# Patient Record
Sex: FEMALE | Race: BLACK OR AFRICAN AMERICAN | NOT HISPANIC OR LATINO | Employment: OTHER | URBAN - METROPOLITAN AREA
[De-identification: names, ages, dates, MRNs, and addresses within clinical notes are randomized per-mention and may not be internally consistent; named-entity substitution may affect disease eponyms.]

---

## 2023-02-16 PROBLEM — G56.02 CARPAL TUNNEL SYNDROME OF LEFT WRIST: Status: ACTIVE | Noted: 2023-02-16

## 2023-02-16 PROBLEM — R05.9 COUGH: Status: ACTIVE | Noted: 2023-02-16

## 2023-02-16 PROBLEM — E55.9 VITAMIN D DEFICIENCY: Status: ACTIVE | Noted: 2023-02-16

## 2023-02-16 PROBLEM — R07.9 CHEST PAIN: Status: ACTIVE | Noted: 2023-02-16

## 2023-02-16 PROBLEM — M25.529 JOINT PAIN, ELBOW: Status: ACTIVE | Noted: 2023-02-16

## 2023-02-16 PROBLEM — R15.9 INCONTINENCE OF FECES: Status: ACTIVE | Noted: 2023-02-16

## 2023-02-16 PROBLEM — N81.89 PELVIC FLOOR WEAKNESS: Status: ACTIVE | Noted: 2023-02-16

## 2023-02-16 PROBLEM — M43.02 SPONDYLOLYSIS, CERVICAL REGION: Status: ACTIVE | Noted: 2023-02-16

## 2023-02-16 PROBLEM — M54.2 NECK ARTHRALGIA: Status: ACTIVE | Noted: 2023-02-16

## 2023-02-16 PROBLEM — N39.3 FEMALE STRESS INCONTINENCE: Status: ACTIVE | Noted: 2023-02-16

## 2023-02-16 PROBLEM — K21.9 GASTROESOPHAGEAL REFLUX DISEASE: Status: ACTIVE | Noted: 2023-02-16

## 2023-02-16 PROBLEM — R10.9 ABDOMINAL PAIN: Status: ACTIVE | Noted: 2023-02-16

## 2023-02-16 PROBLEM — F41.9 ANXIETY DISORDER: Status: ACTIVE | Noted: 2023-02-16

## 2023-02-16 PROBLEM — N39.41 URGE INCONTINENCE: Status: ACTIVE | Noted: 2023-02-16

## 2023-02-16 PROBLEM — R73.9 HYPERGLYCEMIA: Status: ACTIVE | Noted: 2023-02-16

## 2023-02-16 PROBLEM — I10 HYPERTENSION: Status: ACTIVE | Noted: 2023-02-16

## 2023-02-16 PROBLEM — N95.2 ATROPHIC VAGINITIS: Status: ACTIVE | Noted: 2023-02-16

## 2023-02-16 PROBLEM — D50.8 ACHLORHYDRIC ANEMIA: Status: ACTIVE | Noted: 2023-02-16

## 2023-02-16 PROBLEM — K46.9 ENTEROCELE: Status: ACTIVE | Noted: 2023-02-16

## 2023-02-16 PROBLEM — J02.9 PHARYNGITIS: Status: ACTIVE | Noted: 2023-02-16

## 2023-02-16 PROBLEM — M79.603 ARM PAIN: Status: ACTIVE | Noted: 2023-02-16

## 2023-02-16 PROBLEM — D72.829 LEUKOCYTOSIS: Status: ACTIVE | Noted: 2023-02-16

## 2023-02-16 PROBLEM — K76.0 STEATOSIS, LIVER: Status: ACTIVE | Noted: 2023-02-16

## 2023-02-16 PROBLEM — N81.6 RECTOCELE: Status: ACTIVE | Noted: 2023-02-16

## 2023-02-16 PROBLEM — K62.3 RECTAL PROLAPSE: Status: ACTIVE | Noted: 2023-02-16

## 2023-02-16 PROBLEM — K80.20 CHOLELITHIASIS: Status: ACTIVE | Noted: 2023-02-16

## 2023-02-16 PROBLEM — M76.62 ACHILLES TENDINITIS OF LEFT LOWER EXTREMITY: Status: ACTIVE | Noted: 2023-02-16

## 2023-02-16 PROBLEM — M25.572 LEFT ANKLE PAIN: Status: ACTIVE | Noted: 2023-02-16

## 2023-02-16 PROBLEM — R74.8 ELEVATED ALKALINE PHOSPHATASE LEVEL: Status: ACTIVE | Noted: 2023-02-16

## 2023-02-16 PROBLEM — R53.83 FATIGUE: Status: ACTIVE | Noted: 2023-02-16

## 2023-02-16 PROBLEM — R19.5 LOOSE STOOLS: Status: ACTIVE | Noted: 2023-02-16

## 2023-02-16 PROBLEM — J20.9 ACUTE BRONCHITIS: Status: ACTIVE | Noted: 2023-02-16

## 2023-02-16 PROBLEM — M54.9 BACK PAIN, ACUTE: Status: ACTIVE | Noted: 2023-02-16

## 2023-02-16 PROBLEM — R35.0 URINARY FREQUENCY: Status: ACTIVE | Noted: 2023-02-16

## 2023-02-16 PROBLEM — R32 URINE INCONTINENCE: Status: ACTIVE | Noted: 2023-02-16

## 2023-02-16 PROBLEM — E78.5 HYPERLIPIDEMIA: Status: ACTIVE | Noted: 2023-02-16

## 2023-02-16 PROBLEM — R93.2 ABNORMAL ULTRASOUND OF LIVER: Status: ACTIVE | Noted: 2023-02-16

## 2023-02-16 PROBLEM — M25.539 PAIN, WRIST JOINT: Status: ACTIVE | Noted: 2023-02-16

## 2023-02-16 RX ORDER — CLONIDINE HYDROCHLORIDE 0.1 MG/1
1 TABLET ORAL 3 TIMES DAILY
COMMUNITY
Start: 2016-06-04 | End: 2023-04-24 | Stop reason: SDUPTHER

## 2023-02-16 RX ORDER — OMEPRAZOLE 20 MG/1
1 CAPSULE, DELAYED RELEASE ORAL DAILY
COMMUNITY
Start: 2014-10-21 | End: 2023-07-03

## 2023-02-16 RX ORDER — TRIAMTERENE/HYDROCHLOROTHIAZID 37.5-25 MG
1 TABLET ORAL DAILY
COMMUNITY
Start: 2018-02-26 | End: 2023-04-03 | Stop reason: SDUPTHER

## 2023-02-16 RX ORDER — SOLIFENACIN SUCCINATE 5 MG/1
1 TABLET, FILM COATED ORAL DAILY
COMMUNITY
Start: 2022-07-01 | End: 2023-06-23

## 2023-02-16 RX ORDER — VERAPAMIL HYDROCHLORIDE 240 MG/1
1 TABLET, FILM COATED, EXTENDED RELEASE ORAL DAILY
COMMUNITY
Start: 2015-04-13 | End: 2024-02-13 | Stop reason: SDUPTHER

## 2023-02-16 RX ORDER — OXYBUTYNIN CHLORIDE 5 MG/1
1 TABLET ORAL DAILY
COMMUNITY
End: 2023-03-30 | Stop reason: SDUPTHER

## 2023-02-16 RX ORDER — ATORVASTATIN CALCIUM 20 MG/1
1 TABLET, FILM COATED ORAL DAILY
COMMUNITY
Start: 2017-08-10 | End: 2023-06-23

## 2023-03-25 ASSESSMENT — ENCOUNTER SYMPTOMS: HYPERTENSION: 1

## 2023-03-30 ENCOUNTER — OFFICE VISIT (OUTPATIENT)
Dept: PRIMARY CARE | Facility: CLINIC | Age: 63
End: 2023-03-30
Payer: MEDICARE

## 2023-03-30 VITALS — BODY MASS INDEX: 32.59 KG/M2 | SYSTOLIC BLOOD PRESSURE: 152 MMHG | DIASTOLIC BLOOD PRESSURE: 86 MMHG | WEIGHT: 184 LBS

## 2023-03-30 DIAGNOSIS — E78.5 HYPERLIPIDEMIA, UNSPECIFIED HYPERLIPIDEMIA TYPE: ICD-10-CM

## 2023-03-30 DIAGNOSIS — I10 HYPERTENSION, UNSPECIFIED TYPE: Primary | ICD-10-CM

## 2023-03-30 DIAGNOSIS — N39.41 URGE INCONTINENCE: ICD-10-CM

## 2023-03-30 DIAGNOSIS — D64.9 ANEMIA, UNSPECIFIED TYPE: ICD-10-CM

## 2023-03-30 DIAGNOSIS — N39.46 MIXED STRESS AND URGE URINARY INCONTINENCE: ICD-10-CM

## 2023-03-30 PROCEDURE — 3077F SYST BP >= 140 MM HG: CPT | Performed by: INTERNAL MEDICINE

## 2023-03-30 PROCEDURE — 85025 COMPLETE CBC W/AUTO DIFF WBC: CPT | Performed by: INTERNAL MEDICINE

## 2023-03-30 PROCEDURE — 36415 COLL VENOUS BLD VENIPUNCTURE: CPT | Performed by: INTERNAL MEDICINE

## 2023-03-30 PROCEDURE — 1036F TOBACCO NON-USER: CPT | Performed by: INTERNAL MEDICINE

## 2023-03-30 PROCEDURE — 80053 COMPREHEN METABOLIC PANEL: CPT | Performed by: INTERNAL MEDICINE

## 2023-03-30 PROCEDURE — 3079F DIAST BP 80-89 MM HG: CPT | Performed by: INTERNAL MEDICINE

## 2023-03-30 PROCEDURE — 99214 OFFICE O/P EST MOD 30 MIN: CPT | Performed by: INTERNAL MEDICINE

## 2023-03-30 RX ORDER — OXYBUTYNIN CHLORIDE 5 MG/1
5 TABLET ORAL 2 TIMES DAILY
Qty: 180 TABLET | Refills: 3 | Status: SHIPPED | OUTPATIENT
Start: 2023-03-30 | End: 2023-10-24 | Stop reason: SDUPTHER

## 2023-03-30 NOTE — PROGRESS NOTES
Subjective   Patient ID: Freda Douglas is a 62 y.o. female who presents for Follow-up (BP follow up).    HPI   62 years old female comes in to see me as a follow-up on her hypertension and hyperlipidemia, she does have incontinence of urine and she is taking oxybutynin 5 mg up to now twice a day.  She feels well and has no specific symptoms.  Review of Systems  12 system reviewed and  Objective   /86 (BP Location: Left arm, Patient Position: Sitting, BP Cuff Size: Adult)   Wt 83.5 kg (184 lb)   BMI 32.59 kg/m²     She lives in [place] by herself.  Allergic to sulfa but she took it after that and she did fine.  I advised her not to take self since we do not know for sure what is going on.  Non-smoker social alcohol intake.  Fully retired  And history of sacral tumor giant cell tumor removed in 1990.  Feels dizzy at times but does not check her blood pressure when she is dizzy so advised her to do so specifically when she is dizzy otherwise at least twice a day.  Physical Exam  He is alert and oriented very pleasant in no distress.  Nonicteric sclera or jaundice.  Face symmetrical cranial nerves intact.  Neck supple no masses no lymph node no thyromegaly or jugular venous distention.  Lungs clear no rales or wheezing.  Heart normal S1 and S2 regular rhythm.  Abdomen benign neurologically intact.  Advised the patient to check her blood pressure in different positions at home and to keep a log and come back at the next visit with her log and her blood pressure machine.  Advised her about dizzy spells could be hypertension or hypotension and may need to adjust her medication.  Assessment/Plan   Diagnoses and all orders for this visit:  Hypertension, unspecified type  -     Comprehensive Metabolic Panel  -     Basic Metabolic Panel  Anemia, unspecified type  -     CBC  Mixed stress and urge urinary incontinence  -     oxybutynin (Ditropan) 5 mg tablet; Take 1 tablet (5 mg) by mouth in the morning and  1 tablet (5 mg) before bedtime.  Urge incontinence  Hyperlipidemia, unspecified hyperlipidemia type

## 2023-04-03 DIAGNOSIS — I10 HYPERTENSION, UNSPECIFIED TYPE: Primary | ICD-10-CM

## 2023-04-03 RX ORDER — TRIAMTERENE/HYDROCHLOROTHIAZID 37.5-25 MG
1 TABLET ORAL DAILY
Qty: 90 TABLET | Refills: 3 | Status: SHIPPED | OUTPATIENT
Start: 2023-04-03 | End: 2023-11-28 | Stop reason: SDUPTHER

## 2023-04-04 ENCOUNTER — TELEPHONE (OUTPATIENT)
Dept: PRIMARY CARE | Facility: CLINIC | Age: 63
End: 2023-04-04
Payer: MEDICARE

## 2023-04-21 ENCOUNTER — TELEPHONE (OUTPATIENT)
Dept: PRIMARY CARE | Facility: CLINIC | Age: 63
End: 2023-04-21
Payer: MEDICARE

## 2023-04-21 DIAGNOSIS — N32.81 OVERACTIVE BLADDER: ICD-10-CM

## 2023-04-21 RX ORDER — OXYBUTYNIN CHLORIDE 5 MG/1
5 TABLET ORAL 2 TIMES DAILY
Qty: 180 TABLET | Refills: 0 | Status: SHIPPED | OUTPATIENT
Start: 2023-04-21 | End: 2023-06-23

## 2023-04-21 NOTE — TELEPHONE ENCOUNTER
patient did not receive medicasystem did not work yesterday and Rx has to be resend tion she requested

## 2023-04-24 DIAGNOSIS — I10 HYPERTENSION, UNSPECIFIED TYPE: Primary | ICD-10-CM

## 2023-04-24 RX ORDER — CLONIDINE HYDROCHLORIDE 0.1 MG/1
0.1 TABLET ORAL 3 TIMES DAILY
Qty: 180 TABLET | Refills: 3 | Status: SHIPPED | OUTPATIENT
Start: 2023-04-24

## 2023-04-24 RX ORDER — CLONIDINE HYDROCHLORIDE 0.1 MG/1
0.1 TABLET ORAL 2 TIMES DAILY
COMMUNITY
End: 2023-06-23

## 2023-06-21 ASSESSMENT — ENCOUNTER SYMPTOMS: HYPERTENSION: 1

## 2023-06-23 ENCOUNTER — OFFICE VISIT (OUTPATIENT)
Dept: PRIMARY CARE | Facility: CLINIC | Age: 63
End: 2023-06-23
Payer: MEDICARE

## 2023-06-23 VITALS — SYSTOLIC BLOOD PRESSURE: 130 MMHG | DIASTOLIC BLOOD PRESSURE: 70 MMHG | WEIGHT: 176 LBS | BODY MASS INDEX: 31.18 KG/M2

## 2023-06-23 DIAGNOSIS — E78.5 HYPERLIPIDEMIA, UNSPECIFIED HYPERLIPIDEMIA TYPE: ICD-10-CM

## 2023-06-23 DIAGNOSIS — I10 HYPERTENSION, UNSPECIFIED TYPE: Primary | ICD-10-CM

## 2023-06-23 DIAGNOSIS — R53.83 FATIGUE, UNSPECIFIED TYPE: ICD-10-CM

## 2023-06-23 DIAGNOSIS — K21.9 GASTROESOPHAGEAL REFLUX DISEASE WITHOUT ESOPHAGITIS: ICD-10-CM

## 2023-06-23 DIAGNOSIS — N31.9 NEUROGENIC BLADDER: ICD-10-CM

## 2023-06-23 PROCEDURE — 80061 LIPID PANEL: CPT | Performed by: INTERNAL MEDICINE

## 2023-06-23 PROCEDURE — 1036F TOBACCO NON-USER: CPT | Performed by: INTERNAL MEDICINE

## 2023-06-23 PROCEDURE — 3075F SYST BP GE 130 - 139MM HG: CPT | Performed by: INTERNAL MEDICINE

## 2023-06-23 PROCEDURE — 80048 BASIC METABOLIC PNL TOTAL CA: CPT | Performed by: INTERNAL MEDICINE

## 2023-06-23 PROCEDURE — 84443 ASSAY THYROID STIM HORMONE: CPT | Performed by: INTERNAL MEDICINE

## 2023-06-23 PROCEDURE — 99213 OFFICE O/P EST LOW 20 MIN: CPT | Performed by: INTERNAL MEDICINE

## 2023-06-23 PROCEDURE — 85025 COMPLETE CBC W/AUTO DIFF WBC: CPT | Performed by: INTERNAL MEDICINE

## 2023-06-23 PROCEDURE — 3078F DIAST BP <80 MM HG: CPT | Performed by: INTERNAL MEDICINE

## 2023-06-23 RX ORDER — OMEPRAZOLE 20 MG/1
1 TABLET, DELAYED RELEASE ORAL DAILY
COMMUNITY
End: 2023-06-23 | Stop reason: SDUPTHER

## 2023-06-23 RX ORDER — CLONIDINE HYDROCHLORIDE 0.1 MG/1
0.1 TABLET ORAL 2 TIMES DAILY
COMMUNITY
Start: 2016-06-04 | End: 2024-03-12 | Stop reason: SDUPTHER

## 2023-06-23 RX ORDER — DULOXETIN HYDROCHLORIDE 30 MG/1
30 CAPSULE, DELAYED RELEASE ORAL DAILY
COMMUNITY
Start: 2019-08-06

## 2023-06-23 RX ORDER — VALACYCLOVIR HYDROCHLORIDE 1 G/1
1000 TABLET, FILM COATED ORAL 3 TIMES DAILY
COMMUNITY
Start: 2021-09-01

## 2023-06-23 RX ORDER — ESCITALOPRAM OXALATE 20 MG/1
20 TABLET ORAL DAILY
COMMUNITY
Start: 2006-12-13

## 2023-06-23 RX ORDER — OMEPRAZOLE 20 MG/1
20 TABLET, DELAYED RELEASE ORAL DAILY
Qty: 90 TABLET | Refills: 3 | Status: SHIPPED | OUTPATIENT
Start: 2023-06-23 | End: 2023-11-28 | Stop reason: SDUPTHER

## 2023-06-23 RX ORDER — TRIAMTERENE AND HYDROCHLOROTHIAZIDE 37.5; 25 MG/1; MG/1
1 CAPSULE ORAL EVERY MORNING
COMMUNITY
End: 2024-01-09 | Stop reason: SDUPTHER

## 2023-06-23 RX ORDER — ATORVASTATIN CALCIUM 20 MG/1
1 TABLET, FILM COATED ORAL DAILY
COMMUNITY
End: 2024-02-13 | Stop reason: SDUPTHER

## 2023-06-23 NOTE — PROGRESS NOTES
Answers submitted by the patient for this visit:  High Blood Pressure Questionnaire (Submitted on 6/21/2023)  Chief Complaint: Hypertension  Chronicity: chronic  Onset: more than 1 year ago  Progression since onset: waxing and waning  Condition status: resistant  anxiety: Yes  malaise/fatigue: Yes  CAD risks: dyslipidemia, obesity, post-menopausal state  Compliance problems: no compliance problems

## 2023-06-23 NOTE — PROGRESS NOTES
Subjective   Patient ID: Freda Douglas is a 62 y.o. female who presents for Follow-up and Hypertension.    HPI   62 years old female comes in to see me complaining of urgent urination day and night.  She is unable to hold it and she will need to have a bathroom close by all the time.  She has seen a urologist before who offered her some procedure she did not like and she declined by the name of Dr. Calloway.  Now she has the same symptoms and she is taking oxybutynin 5 mg twice a day but she did not think it is helping her and she still does not think it is helping her.  I offered her different urology consultation she would like to consider that not right away.  She also takes omeprazole for heartburn Catapres or clonidine 0.1 mg in the morning and in the evening triamterene HCTZ or Maxide 25 1 tablet/day with verapamil to 40 mg/day.  Review of Systems  12 system reviewed and reconciled.  Objective   /70   Wt 79.8 kg (176 lb)   BMI 31.18 kg/m²     Physical Exam  Alert oriented in no distress nonicteric sclera or jaundice pleasant and cooperative.  Neck supple no masses no lymph no thyromegaly or jugular venous distention.  Lungs clear no rales no wheezing or crackles.  Heart normal S1 and S2 regular rhythm.  Abdomen benign neurologically intact.  Assessment/Plan   Diagnoses and all orders for this visit:  Hypertension, unspecified type  -     CBC  -     Basic Metabolic Panel  Hyperlipidemia, unspecified hyperlipidemia type  -     Lipid Panel  Fatigue, unspecified type  -     Thyroid Stimulating Hormone  Neurogenic bladder

## 2023-06-26 DIAGNOSIS — K21.9 GASTROESOPHAGEAL REFLUX DISEASE WITHOUT ESOPHAGITIS: Primary | ICD-10-CM

## 2023-07-03 RX ORDER — OMEPRAZOLE 20 MG/1
CAPSULE, DELAYED RELEASE ORAL
Qty: 90 CAPSULE | Refills: 1 | Status: SHIPPED | OUTPATIENT
Start: 2023-07-03 | End: 2023-07-10 | Stop reason: SDUPTHER

## 2023-07-09 ENCOUNTER — TELEPHONE (OUTPATIENT)
Dept: PRIMARY CARE | Facility: CLINIC | Age: 63
End: 2023-07-09
Payer: MEDICARE

## 2023-07-10 DIAGNOSIS — K21.9 GASTROESOPHAGEAL REFLUX DISEASE WITHOUT ESOPHAGITIS: ICD-10-CM

## 2023-07-10 RX ORDER — OMEPRAZOLE 20 MG/1
20 CAPSULE, DELAYED RELEASE ORAL DAILY
Qty: 90 CAPSULE | Refills: 3 | Status: SHIPPED | OUTPATIENT
Start: 2023-07-10

## 2023-10-24 ENCOUNTER — TELEPHONE (OUTPATIENT)
Dept: PRIMARY CARE | Facility: CLINIC | Age: 63
End: 2023-10-24
Payer: MEDICARE

## 2023-10-24 DIAGNOSIS — N39.46 MIXED STRESS AND URGE URINARY INCONTINENCE: ICD-10-CM

## 2023-10-25 RX ORDER — OXYBUTYNIN CHLORIDE 5 MG/1
5 TABLET ORAL 2 TIMES DAILY
Qty: 180 TABLET | Refills: 3 | Status: SHIPPED | OUTPATIENT
Start: 2023-10-25 | End: 2023-12-19

## 2023-11-28 ENCOUNTER — TELEMEDICINE (OUTPATIENT)
Dept: PRIMARY CARE | Facility: CLINIC | Age: 63
End: 2023-11-28
Payer: MEDICARE

## 2023-11-28 DIAGNOSIS — J01.90 ACUTE NON-RECURRENT SINUSITIS, UNSPECIFIED LOCATION: ICD-10-CM

## 2023-11-28 DIAGNOSIS — J20.9 ACUTE BRONCHITIS, UNSPECIFIED ORGANISM: Primary | ICD-10-CM

## 2023-11-28 PROCEDURE — 99441 PR PHYS/QHP TELEPHONE EVALUATION 5-10 MIN: CPT | Performed by: INTERNAL MEDICINE

## 2023-11-28 RX ORDER — AZITHROMYCIN 250 MG/1
TABLET, FILM COATED ORAL
Qty: 6 TABLET | Refills: 0 | Status: SHIPPED | OUTPATIENT
Start: 2023-11-28 | End: 2023-12-03

## 2023-11-28 RX ORDER — BENZONATATE 200 MG/1
200 CAPSULE ORAL 3 TIMES DAILY PRN
Qty: 30 CAPSULE | Refills: 1 | Status: SHIPPED | OUTPATIENT
Start: 2023-11-28 | End: 2023-12-28

## 2023-11-28 NOTE — PROGRESS NOTES
Subjective   Patient ID: Freda Escamilla is a 62 y.o. female this is a virtual visit.    HPI  I had telephone visit with Ms. escamilla today on November 28, 2023 at around 4:15 PM.  She is complaining of congested had for 2 weeks coughing sometimes clear sometimes yellow phlegm, wheezing at times and feeling tired, cannot get warm during the day and she feels exhausted sometimes.  She thinks maybe her sinuses are infected.  It has been 2 weeks and now she needs some help on top of that she had severe headaches.  After discussing her symptoms I agreed that she could have a sinus infection or upper bronchitis.  We also agreed to give her an antibiotic and a cough Perles.  Advised her to increase vitamin C vitamin D and zinc oxide.  Her pharmacy is [facility] [id number].  Review of Systems  Congested head with severe headache, sinus infection, coughing phlegm wheezing tired fatigued and feeling cold  Objective   There were no vitals taken for this visit.  This visit was completed via telephone with the possibility of COVID-19 infection and due to the restrictions.  All issues discussed and addressed but no physical examination was performed.  If it was felt that the patient should be evaluated in clinic then they were directed there.  The patient verbally consented to this visit.  I spent 5 minutes and more than on the phone with this patient discussing health concerns  Physical Exam  No physical exam was done.  Assessment/Plan   Diagnoses and all orders for this visit:  Acute bronchitis, unspecified organism  Acute non-recurrent sinusitis, unspecified location

## 2023-12-19 DIAGNOSIS — N39.46 MIXED STRESS AND URGE URINARY INCONTINENCE: ICD-10-CM

## 2023-12-19 RX ORDER — OXYBUTYNIN CHLORIDE 5 MG/1
5 TABLET ORAL DAILY
Qty: 90 TABLET | Refills: 3 | Status: SHIPPED | OUTPATIENT
Start: 2023-12-19

## 2024-01-09 ENCOUNTER — TELEPHONE (OUTPATIENT)
Dept: PRIMARY CARE | Facility: CLINIC | Age: 64
End: 2024-01-09
Payer: MEDICARE

## 2024-01-09 DIAGNOSIS — I10 HYPERTENSION, UNSPECIFIED TYPE: ICD-10-CM

## 2024-01-09 RX ORDER — TRIAMTERENE AND HYDROCHLOROTHIAZIDE 37.5; 25 MG/1; MG/1
1 CAPSULE ORAL EVERY MORNING
Qty: 90 CAPSULE | Refills: 3 | Status: SHIPPED | OUTPATIENT
Start: 2024-01-09 | End: 2025-01-08

## 2024-02-04 ASSESSMENT — ENCOUNTER SYMPTOMS
SWEATS: 0
HEADACHES: 0
BLURRED VISION: 1
HYPERTENSION: 1
PND: 0
ORTHOPNEA: 0
PALPITATIONS: 0
NECK PAIN: 0
SHORTNESS OF BREATH: 0

## 2024-02-06 ENCOUNTER — OFFICE VISIT (OUTPATIENT)
Dept: PRIMARY CARE | Facility: CLINIC | Age: 64
End: 2024-02-06
Payer: MEDICARE

## 2024-02-06 VITALS
DIASTOLIC BLOOD PRESSURE: 81 MMHG | OXYGEN SATURATION: 93 % | BODY MASS INDEX: 31.35 KG/M2 | SYSTOLIC BLOOD PRESSURE: 167 MMHG | WEIGHT: 177 LBS | HEART RATE: 70 BPM | TEMPERATURE: 97.3 F

## 2024-02-06 DIAGNOSIS — E78.2 MIXED HYPERLIPIDEMIA: ICD-10-CM

## 2024-02-06 DIAGNOSIS — I10 HYPERTENSION, UNSPECIFIED TYPE: Primary | ICD-10-CM

## 2024-02-06 DIAGNOSIS — K21.9 GASTROESOPHAGEAL REFLUX DISEASE WITHOUT ESOPHAGITIS: ICD-10-CM

## 2024-02-06 DIAGNOSIS — E78.5 HYPERLIPIDEMIA, UNSPECIFIED HYPERLIPIDEMIA TYPE: ICD-10-CM

## 2024-02-06 PROCEDURE — 99214 OFFICE O/P EST MOD 30 MIN: CPT | Performed by: INTERNAL MEDICINE

## 2024-02-06 PROCEDURE — 1036F TOBACCO NON-USER: CPT | Performed by: INTERNAL MEDICINE

## 2024-02-06 PROCEDURE — 80061 LIPID PANEL: CPT | Performed by: INTERNAL MEDICINE

## 2024-02-06 PROCEDURE — 3079F DIAST BP 80-89 MM HG: CPT | Performed by: INTERNAL MEDICINE

## 2024-02-06 PROCEDURE — 3077F SYST BP >= 140 MM HG: CPT | Performed by: INTERNAL MEDICINE

## 2024-02-06 PROCEDURE — 80053 COMPREHEN METABOLIC PANEL: CPT | Performed by: INTERNAL MEDICINE

## 2024-02-06 ASSESSMENT — PATIENT HEALTH QUESTIONNAIRE - PHQ9
SUM OF ALL RESPONSES TO PHQ9 QUESTIONS 1 AND 2: 0
1. LITTLE INTEREST OR PLEASURE IN DOING THINGS: NOT AT ALL
2. FEELING DOWN, DEPRESSED OR HOPELESS: NOT AT ALL

## 2024-02-06 ASSESSMENT — ENCOUNTER SYMPTOMS
LOSS OF SENSATION IN FEET: 0
OCCASIONAL FEELINGS OF UNSTEADINESS: 0
DEPRESSION: 0

## 2024-02-06 NOTE — PROGRESS NOTES
Subjective   Patient ID: Freda Douglas is a 63 y.o. female who presents for Follow-up and Med Refill.    HPI   63 years old female comes in to see me today to check on her medication and her medical condition.  She needs refill on atorvastatin and on verapamil.  She is with [facility] pharmacy.  She has no specific symptoms or complaint.  She gained 1 pounds on.  Her blood pressure is slightly elevated 167/81.  We discussed diet and weight loss.  Review of Systems  12 system reviewed 12 systems are negative.  Objective   /81 (BP Location: Right arm, Patient Position: Sitting, BP Cuff Size: Large adult)   Pulse 70   Temp 36.3 °C (97.3 °F) (Temporal)   Wt 80.3 kg (177 lb)   SpO2 93%   BMI 31.35 kg/m²     Physical Exam  Alert oriented in no distress very pleasant and cooperative.  Nonicteric sclera or jaundice.  Face symmetrical cranial nerves intact.  Neck supple no masses no lymph node thyromegaly or jugular venous distention or carotid bruits.  Lungs clear no rales wheezing or crackles.  Heart normal S1 and S2 regular rhythm.  Abdomen benign nontender no masses no organomegaly.  Neurological exam intact.  Since the blood pressure is slightly elevated we agreed to increase clonidine point 1 mg 2 in the morning and then to stay on 1 at lunch and 1 at dinner time.  So an increase of 1 extra tablet of 0.1 mg a day.  Also advised the patient to watch her blood pressure and to keep a log daily.  Also invited her to bring her blood pressure pill and her log at the next visit.  To call me as soon as she knows that her blood pressure is staying up or going down to keep me informed.  Next visit bring all your medication and your blood pressure with you with your log so we can work on your blood pressure and controlled better.  Assessment/Plan     Diagnoses and all orders for this visit:  Hypertension, unspecified type  -     Comprehensive Metabolic Panel  Mixed hyperlipidemia  -     Lipid  Panel  Gastroesophageal reflux disease without esophagitis  Hyperlipidemia, unspecified hyperlipidemia type

## 2024-02-09 ENCOUNTER — TELEPHONE (OUTPATIENT)
Dept: PRIMARY CARE | Facility: CLINIC | Age: 64
End: 2024-02-09
Payer: MEDICARE

## 2024-02-09 NOTE — TELEPHONE ENCOUNTER
----- Message from Ishan Chandra MD sent at 2/7/2024  2:11 PM EST -----  Regarding: r  Lab results are within normal limit.  Excellent liver function test and excellent renal test.  Normal lipid panel.  Stay on the same diet and activity with exercising and watching your weight.  Doing well.

## 2024-02-13 ENCOUNTER — TELEPHONE (OUTPATIENT)
Dept: PRIMARY CARE | Facility: CLINIC | Age: 64
End: 2024-02-13
Payer: MEDICARE

## 2024-02-13 DIAGNOSIS — I10 HYPERTENSION, UNSPECIFIED TYPE: ICD-10-CM

## 2024-02-13 RX ORDER — VERAPAMIL HYDROCHLORIDE 240 MG/1
240 TABLET, FILM COATED, EXTENDED RELEASE ORAL DAILY
Qty: 90 TABLET | Refills: 3 | Status: SHIPPED | OUTPATIENT
Start: 2024-02-13 | End: 2024-02-14 | Stop reason: SDUPTHER

## 2024-02-13 RX ORDER — ATORVASTATIN CALCIUM 20 MG/1
20 TABLET, FILM COATED ORAL DAILY
Qty: 90 TABLET | Refills: 3 | Status: SHIPPED | OUTPATIENT
Start: 2024-02-13 | End: 2024-02-14 | Stop reason: SDUPTHER

## 2024-02-14 DIAGNOSIS — I10 HYPERTENSION, UNSPECIFIED TYPE: ICD-10-CM

## 2024-02-14 RX ORDER — ATORVASTATIN CALCIUM 20 MG/1
20 TABLET, FILM COATED ORAL DAILY
Qty: 90 TABLET | Refills: 3 | Status: SHIPPED | OUTPATIENT
Start: 2024-02-14 | End: 2025-02-13

## 2024-02-14 RX ORDER — VERAPAMIL HYDROCHLORIDE 240 MG/1
240 TABLET, FILM COATED, EXTENDED RELEASE ORAL DAILY
Qty: 90 TABLET | Refills: 3 | Status: SHIPPED | OUTPATIENT
Start: 2024-02-14 | End: 2025-02-13

## 2024-02-19 ENCOUNTER — TELEPHONE (OUTPATIENT)
Dept: PRIMARY CARE | Facility: CLINIC | Age: 64
End: 2024-02-19
Payer: MEDICARE

## 2024-02-19 NOTE — TELEPHONE ENCOUNTER
----- Message from Ishan Chandra MD sent at 2/19/2024  8:43 AM EST -----  Regarding: r  Normal results

## 2024-03-12 ENCOUNTER — TELEPHONE (OUTPATIENT)
Dept: PRIMARY CARE | Facility: CLINIC | Age: 64
End: 2024-03-12
Payer: MEDICARE

## 2024-03-12 DIAGNOSIS — I10 HYPERTENSION, UNSPECIFIED TYPE: ICD-10-CM

## 2024-03-12 RX ORDER — CLONIDINE HYDROCHLORIDE 0.1 MG/1
0.1 TABLET ORAL 4 TIMES DAILY
Qty: 360 TABLET | Refills: 3 | Status: SHIPPED | OUTPATIENT
Start: 2024-03-12 | End: 2025-03-12

## 2024-09-01 DIAGNOSIS — N39.46 MIXED STRESS AND URGE URINARY INCONTINENCE: ICD-10-CM

## 2024-09-04 RX ORDER — OXYBUTYNIN CHLORIDE 5 MG/1
5 TABLET ORAL 2 TIMES DAILY
Qty: 180 TABLET | Refills: 2 | Status: SHIPPED | OUTPATIENT
Start: 2024-09-04

## 2024-09-27 DIAGNOSIS — K21.9 GASTROESOPHAGEAL REFLUX DISEASE WITHOUT ESOPHAGITIS: ICD-10-CM

## 2024-09-30 RX ORDER — OMEPRAZOLE 20 MG/1
CAPSULE, DELAYED RELEASE ORAL
Qty: 90 CAPSULE | Refills: 1 | Status: SHIPPED | OUTPATIENT
Start: 2024-09-30

## 2024-10-10 ASSESSMENT — ENCOUNTER SYMPTOMS
SWEATS: 0
BLURRED VISION: 0
NECK PAIN: 1
SHORTNESS OF BREATH: 0
ORTHOPNEA: 0
HEADACHES: 0
PND: 0
HYPERTENSION: 1
PALPITATIONS: 0

## 2024-10-11 ENCOUNTER — APPOINTMENT (OUTPATIENT)
Dept: PRIMARY CARE | Facility: CLINIC | Age: 64
End: 2024-10-11
Payer: MEDICARE

## 2024-10-11 VITALS
HEIGHT: 62 IN | WEIGHT: 170 LBS | DIASTOLIC BLOOD PRESSURE: 52 MMHG | OXYGEN SATURATION: 96 % | SYSTOLIC BLOOD PRESSURE: 143 MMHG | TEMPERATURE: 97.5 F | BODY MASS INDEX: 31.28 KG/M2 | HEART RATE: 59 BPM

## 2024-10-11 DIAGNOSIS — D64.9 ANEMIA, UNSPECIFIED TYPE: Primary | ICD-10-CM

## 2024-10-11 DIAGNOSIS — R53.83 FATIGUE, UNSPECIFIED TYPE: ICD-10-CM

## 2024-10-11 DIAGNOSIS — Z12.31 SCREENING MAMMOGRAM FOR BREAST CANCER: ICD-10-CM

## 2024-10-11 DIAGNOSIS — Z00.00 ROUTINE GENERAL MEDICAL EXAMINATION AT HEALTH CARE FACILITY: ICD-10-CM

## 2024-10-11 DIAGNOSIS — E78.2 MIXED HYPERLIPIDEMIA: ICD-10-CM

## 2024-10-11 DIAGNOSIS — I10 HYPERTENSION, UNSPECIFIED TYPE: ICD-10-CM

## 2024-10-11 LAB
ALBUMIN SERPL BCP-MCNC: 3.9 G/DL (ref 3.4–5)
ALP SERPL-CCNC: 117 U/L (ref 45–117)
ALT SERPL W P-5'-P-CCNC: 31 U/L (ref 16–63)
ANION GAP SERPL CALC-SCNC: 10 MMOL/L (ref 10–20)
AST SERPL W P-5'-P-CCNC: 20 U/L (ref 15–37)
BASOPHILS # BLD AUTO: 0.01 X10*3/UL (ref 0.1–1.6)
BASOPHILS NFR BLD AUTO: 0.16 % (ref 0–0.3)
BILIRUB SERPL-MCNC: 0.5 MG/DL (ref 0.2–1)
BUN SERPL-MCNC: 11 MG/DL (ref 7–18)
CALCIUM SERPL-MCNC: 9.6 MG/DL (ref 8.5–10.1)
CHLORIDE SERPL-SCNC: 97 MMOL/L (ref 98–107)
CHOLEST SERPL-MCNC: 156 MG/DL (ref 0–199)
CHOLESTEROL/HDL RATIO: 2.8 (ref 4.2–7)
CO2 SERPL-SCNC: 31 MMOL/L (ref 21–32)
CREAT SERPL-MCNC: 0.83 MG/DL (ref 0.6–1.1)
EGFRCR SERPLBLD CKD-EPI 2021: 79 ML/MIN/1.73M*2
EOSINOPHIL # BLD AUTO: 0.13 X10*3/UL (ref 0.04–0.5)
EOSINOPHIL NFR BLD AUTO: 1.59 % (ref 0.7–7)
ERYTHROCYTE [DISTWIDTH] IN BLOOD BY AUTOMATED COUNT: 15.1 % (ref 11.5–14.5)
GLUCOSE SERPL-MCNC: 98 MG/DL (ref 74–100)
HCT VFR BLD AUTO: 40.9 % (ref 36.6–46.6)
HDLC SERPL-MCNC: 56 MG/DL (ref 40–59)
HGB BLD-MCNC: 13.77 G/DL (ref 12–15.4)
IS PATIENT FASTING: ABNORMAL
LDLC SERPL DIRECT ASSAY-MCNC: 85 MG/DL (ref 0–100)
LYMPHOCYTES # BLD AUTO: 1.83 X10*3/UL (ref 0–6)
LYMPHOCYTES NFR BLD AUTO: 23.14 % (ref 20.5–51.1)
MCH RBC QN AUTO: 29.1 PG (ref 26–32)
MCHC RBC AUTO-ENTMCNC: 33.7 G/DL (ref 31–38)
MCV RBC AUTO: 86.4 FL (ref 80–96)
MONOCYTES # BLD AUTO: 0.46 X10*3/UL (ref 1.6–24.9)
MONOCYTES NFR BLD AUTO: 5.79 % (ref 1.7–9.3)
NEUTROPHILS # BLD AUTO: 5.49 X10*3/UL (ref 1.4–6.5)
NEUTROPHILS NFR BLD AUTO: 69.32 % (ref 42.2–75.2)
PLATELET # BLD AUTO: 348.2 X10*3/UL (ref 150–450)
PMV BLD AUTO: 8.22 FL (ref 7.8–11)
POTASSIUM SERPL-SCNC: 3.3 MMOL/L (ref 3.5–5.1)
PROT SERPL-MCNC: 8.1 G/DL (ref 6.4–8.2)
RBC # BLD AUTO: 4.73 X10*6/UL (ref 3.9–5.3)
SODIUM SERPL-SCNC: 135 MMOL/L (ref 136–145)
TRIGL SERPL-MCNC: 131 MG/DL
TSH SERPL-ACNC: 1.61 MIU/L (ref 0.44–3.98)
WBC # BLD AUTO: 7.92 X10*3/UL (ref 4.5–10.5)

## 2024-10-11 PROCEDURE — 1036F TOBACCO NON-USER: CPT | Performed by: INTERNAL MEDICINE

## 2024-10-11 PROCEDURE — 80061 LIPID PANEL: CPT | Performed by: INTERNAL MEDICINE

## 2024-10-11 PROCEDURE — 85025 COMPLETE CBC W/AUTO DIFF WBC: CPT

## 2024-10-11 PROCEDURE — 3077F SYST BP >= 140 MM HG: CPT | Performed by: INTERNAL MEDICINE

## 2024-10-11 PROCEDURE — G0438 PPPS, INITIAL VISIT: HCPCS | Performed by: INTERNAL MEDICINE

## 2024-10-11 PROCEDURE — 99214 OFFICE O/P EST MOD 30 MIN: CPT | Performed by: INTERNAL MEDICINE

## 2024-10-11 PROCEDURE — 3008F BODY MASS INDEX DOCD: CPT | Performed by: INTERNAL MEDICINE

## 2024-10-11 PROCEDURE — 84443 ASSAY THYROID STIM HORMONE: CPT | Performed by: INTERNAL MEDICINE

## 2024-10-11 PROCEDURE — 80053 COMPREHEN METABOLIC PANEL: CPT | Performed by: INTERNAL MEDICINE

## 2024-10-11 PROCEDURE — 3078F DIAST BP <80 MM HG: CPT | Performed by: INTERNAL MEDICINE

## 2024-10-11 ASSESSMENT — PATIENT HEALTH QUESTIONNAIRE - PHQ9
SUM OF ALL RESPONSES TO PHQ9 QUESTIONS 1 AND 2: 0
2. FEELING DOWN, DEPRESSED OR HOPELESS: NOT AT ALL
1. LITTLE INTEREST OR PLEASURE IN DOING THINGS: NOT AT ALL

## 2024-10-11 ASSESSMENT — COLUMBIA-SUICIDE SEVERITY RATING SCALE - C-SSRS
1. IN THE PAST MONTH, HAVE YOU WISHED YOU WERE DEAD OR WISHED YOU COULD GO TO SLEEP AND NOT WAKE UP?: NO
2. HAVE YOU ACTUALLY HAD ANY THOUGHTS OF KILLING YOURSELF?: NO
6. HAVE YOU EVER DONE ANYTHING, STARTED TO DO ANYTHING, OR PREPARED TO DO ANYTHING TO END YOUR LIFE?: NO

## 2024-10-11 ASSESSMENT — ENCOUNTER SYMPTOMS
LOSS OF SENSATION IN FEET: 0
DEPRESSION: 0
OCCASIONAL FEELINGS OF UNSTEADINESS: 0

## 2024-10-11 NOTE — PROGRESS NOTES
"Subjective   Reason for Visit: Freda Douglas is an 63 y.o. female here for a Medicare Wellness visit.          Reviewed all medications by prescribing practitioner or clinical pharmacist (such as prescriptions, OTCs, herbal therapies and supplements) and documented in the medical record.    HPI  63 years old female comes in to see me today for her first Medicare wellness exam follow-up visit.  She has a history of hyperlipidemia and hypertension including chest pain fatty liver and GERD.  She is due for mammograms.  Colonoscopy done and she is due for repeat in .  She has a history of giant cell tumor in the sacrum and hip.  This required several surgeries to completely debulk the tumor.  She also underwent chemotherapy and radiation therapy.  This change her balance and sensation.  Right now she is complaining of urgency is spasm prolapse rectum.  She had seen several urologist in the past.  She is leaking at times she said.  Leaking when coughing laughing or sneezing.  He will not go anywhere or travel, she stays home because she is closer to a bathroom.  I urged her to go see her urologist again.  She is complaining of hip pain and other urinary symptoms.  She promised to do that.  Lives in [place] by herself with a dog.   since 2019.  Allergic to sulfa drugs.  Non-smoker drinks 1 glass of wine a day.  Fully retired  1 son and 1 daughter here in town.  Father  from lung cancer.  Mother  dementia CVA.  1 brother with CAD.  1 brother  from prostate cancer.  Medication reviewed and reconciled.    Patient Care Team:  Ishan Chandra MD as PCP - General (Internal Medicine)  Ishan Chandra MD as PCP - [employer] PCP     Review of Systems  12 system review 12 system are negative.  Objective   Vitals:  /52 (BP Location: Left arm, Patient Position: Sitting, BP Cuff Size: Large adult)   Pulse 59   Temp 36.4 °C (97.5 °F) (Temporal)   Ht 1.575 m (5' 2.01\")   Wt " 77.1 kg (170 lb)   SpO2 96%   BMI 31.09 kg/m²       Physical Exam  Alert oriented in no distress nonicteric sclera no jaundice.  Face symmetrical cranial nerves intact.  Neck supple no masses lymph no thyromegaly or jugular venous distention.  Lungs clear no rales wheezing or crackles.  Heart normal S1 and S2 regular rhythm.  Abdomen benign nontender no masses or organomegaly I could palpate.  Hip pain on the left.  No skin rashes or lesions.  Neurologically intact.  Agreed to take care of her self and follow-up with urology for her major complaint.    Assessment & Plan  Anemia, unspecified type    Orders:    CBC w/5 Part Differential, [facility]      Hypertension, unspecified type    Orders:    Comprehensive Metabolic Panel      Mixed hyperlipidemia    Orders:    Lipid Panel      Fatigue, unspecified type    Orders:    Thyroid Stimulating Hormone      Screening mammogram for breast cancer    Orders:    BI mammo bilateral screening tomosynthesis; Future      Routine general medical examination at health care facility    Orders:    1 Year Follow Up In Primary Care - Wellness Exam; Future

## 2024-10-12 NOTE — PROGRESS NOTES
I called the patient today on Saturday, October 12, 2024 and give her an update on her lab results.  She had normal lipid panel, normal thyroid or TSH, normal complete blood count, normal liver and kidney function, her potassium level is slightly low I encouraged her to eat bananas and other things rich in potassium.  And as she promised to make sure  she calls urology next week for an appointment.  Answers submitted by the patient for this visit:  High Blood Pressure Questionnaire (Submitted on 10/10/2024)  Chief Complaint: Hypertension  Chronicity: recurrent  Onset: more than 1 year ago  Progression since onset: waxing and waning  Condition status: controlled  anxiety: Yes  blurred vision: No  chest pain: No  headaches: No  malaise/fatigue: No  neck pain: Yes  orthopnea: No  palpitations: No  peripheral edema: No  PND: No  shortness of breath: No  sweats: No  Agents associated with hypertension: NSAIDs  CAD risks: dyslipidemia, obesity, post-menopausal state  Compliance problems: no compliance problems

## 2024-10-18 ENCOUNTER — APPOINTMENT (OUTPATIENT)
Dept: SURGERY | Facility: CLINIC | Age: 64
End: 2024-10-18
Payer: MEDICARE

## 2024-10-18 ENCOUNTER — OFFICE VISIT (OUTPATIENT)
Dept: SURGERY | Facility: CLINIC | Age: 64
End: 2024-10-18
Payer: MEDICARE

## 2024-10-18 VITALS
DIASTOLIC BLOOD PRESSURE: 83 MMHG | SYSTOLIC BLOOD PRESSURE: 144 MMHG | WEIGHT: 175 LBS | BODY MASS INDEX: 32 KG/M2 | HEART RATE: 78 BPM

## 2024-10-18 DIAGNOSIS — K62.3 RECTAL PROLAPSE: Primary | ICD-10-CM

## 2024-10-18 PROCEDURE — 99213 OFFICE O/P EST LOW 20 MIN: CPT | Performed by: NURSE PRACTITIONER

## 2024-10-18 PROCEDURE — 3079F DIAST BP 80-89 MM HG: CPT | Performed by: NURSE PRACTITIONER

## 2024-10-18 PROCEDURE — 3077F SYST BP >= 140 MM HG: CPT | Performed by: NURSE PRACTITIONER

## 2024-10-18 NOTE — PROGRESS NOTES
History Of Present Illness  Freda Douglas is a 63 y.o. female with a hx of a sacrectomy for a sacral mass at [facility] and again at  in the early 1990's who is presenting with a rectal prolapse.    The pathology was positive for cancer to where she had to do radiation. After her 2nd surgery to remove the residual of the mass, she had to have a wound vac. She has had accidents of stool since the sacral mass was removed. . She has only had 1 accident/week since she has been taking the Metamucil otherwise it was daily. She tried Imodium but it did not help to bulk her up.     She was suppose to have the prolapse repaired by Dr. Mondragon 2 years ago but wanted to hold off.    Colonoscopy 2022 by DR. Ignacio with bx of the rectum that showed chronic inflammation.        She is having a lot of mucus and blood now with the rectal prolapse.  She thinks that the prolapse is out more now even without having a BM.      She has constipation and will have a BM every few days.  She does not have the sensation or urge to have a BM unless she takes a laxative.  She takes Metamucil daily and takes Dulcolax laxative every 3 days.  She tired Miralax in the past and it worked but then it stops working.  Senna does not help either.      Past Medical History  She has a past medical history of Abnormal weight gain, Malignant neoplasm of pelvic bones, sacrum and coccyx (Multi), Personal history of neoplasm of uncertain behavior (08/12/2022), Personal history of other diseases of the musculoskeletal system and connective tissue, and Personal history of other endocrine, nutritional and metabolic disease.    Surgical History  She has a past surgical history that includes Other surgical history (09/02/2022); Tubal ligation (03/15/2016); Bladder surgery (03/15/2016); and Other surgical history (03/15/2016).     Social History  She reports that she has quit smoking. Her smoking use included cigarettes. She has never used smokeless tobacco. She  reports current alcohol use of about 7.0 standard drinks of alcohol per week. She reports that she does not currently use drugs after having used the following drugs: Marijuana.    Family History  Family History   Problem Relation Name Age of Onset    Multiple sclerosis Mother          Allergies  Sulfa (sulfonamide antibiotics)    Review of Systems   All other systems reviewed and are negative.       Physical Exam  Constitutional:       Appearance: Normal appearance.   HENT:      Head: Normocephalic and atraumatic.   Pulmonary:      Effort: Pulmonary effort is normal.   Genitourinary:     Comments: Had her sit on the toilet and push and she has a full rectal prolapse.  Musculoskeletal:         General: Normal range of motion.   Skin:     General: Skin is warm and dry.   Neurological:      General: No focal deficit present.      Mental Status: She is alert and oriented to person, place, and time.   Psychiatric:         Mood and Affect: Mood normal.         Behavior: Behavior normal.          Last Recorded Vitals  /83   Pulse 78   Wt 79.4 kg (175 lb)        Assessment/Plan   Freda has a full rectal prolapse.  She will start taking Miralax and Metamucil daily to help her have better BM's.  She will schedule to see one of our colorectal surgeons in the near future for surgery.  She will call in a few weeks if the medication is not helping her have better BM's.       Angeles Harris, APRN-CNP

## 2024-10-28 ENCOUNTER — APPOINTMENT (OUTPATIENT)
Dept: UROLOGY | Facility: CLINIC | Age: 64
End: 2024-10-28
Payer: MEDICARE

## 2024-10-28 DIAGNOSIS — R32 URINARY INCONTINENCE, UNSPECIFIED TYPE: ICD-10-CM

## 2024-10-28 PROCEDURE — 51741 ELECTRO-UROFLOWMETRY FIRST: CPT | Performed by: OBSTETRICS & GYNECOLOGY

## 2024-10-28 PROCEDURE — 51797 INTRAABDOMINAL PRESSURE TEST: CPT | Performed by: OBSTETRICS & GYNECOLOGY

## 2024-10-28 PROCEDURE — 51729 CYSTOMETROGRAM W/VP&UP: CPT | Performed by: OBSTETRICS & GYNECOLOGY

## 2024-10-28 PROCEDURE — 51784 ANAL/URINARY MUSCLE STUDY: CPT | Performed by: OBSTETRICS & GYNECOLOGY

## 2024-10-31 ENCOUNTER — APPOINTMENT (OUTPATIENT)
Dept: UROLOGY | Facility: CLINIC | Age: 64
End: 2024-10-31
Payer: MEDICARE

## 2024-11-01 ENCOUNTER — OFFICE VISIT (OUTPATIENT)
Dept: UROLOGY | Facility: CLINIC | Age: 64
End: 2024-11-01
Payer: MEDICARE

## 2024-11-01 VITALS
BODY MASS INDEX: 31.32 KG/M2 | SYSTOLIC BLOOD PRESSURE: 136 MMHG | WEIGHT: 171.3 LBS | DIASTOLIC BLOOD PRESSURE: 82 MMHG | HEART RATE: 79 BPM

## 2024-11-01 DIAGNOSIS — K46.9 ENTEROCELE: ICD-10-CM

## 2024-11-01 DIAGNOSIS — K62.3 RECTAL PROLAPSE: ICD-10-CM

## 2024-11-01 DIAGNOSIS — N81.6 RECTOCELE: ICD-10-CM

## 2024-11-01 DIAGNOSIS — N39.0 ACUTE UTI: Primary | ICD-10-CM

## 2024-11-01 DIAGNOSIS — R15.9 INCONTINENCE OF FECES, UNSPECIFIED FECAL INCONTINENCE TYPE: ICD-10-CM

## 2024-11-01 DIAGNOSIS — N39.41 URGE INCONTINENCE: ICD-10-CM

## 2024-11-01 LAB
POC APPEARANCE, URINE: CLEAR
POC BILIRUBIN, URINE: NEGATIVE
POC BLOOD, URINE: ABNORMAL
POC COLOR, URINE: YELLOW
POC GLUCOSE, URINE: NEGATIVE MG/DL
POC KETONES, URINE: NEGATIVE MG/DL
POC LEUKOCYTES, URINE: ABNORMAL
POC NITRITE,URINE: POSITIVE
POC PH, URINE: 6.5 PH
POC PROTEIN, URINE: NEGATIVE MG/DL
POC SPECIFIC GRAVITY, URINE: 1.02
POC UROBILINOGEN, URINE: 0.2 EU/DL

## 2024-11-01 PROCEDURE — 51729 CYSTOMETROGRAM W/VP&UP: CPT | Performed by: OBSTETRICS & GYNECOLOGY

## 2024-11-01 PROCEDURE — 51784 ANAL/URINARY MUSCLE STUDY: CPT | Performed by: OBSTETRICS & GYNECOLOGY

## 2024-11-01 PROCEDURE — 1036F TOBACCO NON-USER: CPT | Performed by: OBSTETRICS & GYNECOLOGY

## 2024-11-01 PROCEDURE — 99213 OFFICE O/P EST LOW 20 MIN: CPT | Mod: 25 | Performed by: OBSTETRICS & GYNECOLOGY

## 2024-11-01 PROCEDURE — 51797 INTRAABDOMINAL PRESSURE TEST: CPT | Performed by: OBSTETRICS & GYNECOLOGY

## 2024-11-01 PROCEDURE — 51741 ELECTRO-UROFLOWMETRY FIRST: CPT | Performed by: OBSTETRICS & GYNECOLOGY

## 2024-11-01 PROCEDURE — 81003 URINALYSIS AUTO W/O SCOPE: CPT | Performed by: OBSTETRICS & GYNECOLOGY

## 2024-11-01 PROCEDURE — 3079F DIAST BP 80-89 MM HG: CPT | Performed by: OBSTETRICS & GYNECOLOGY

## 2024-11-01 PROCEDURE — G2211 COMPLEX E/M VISIT ADD ON: HCPCS | Performed by: OBSTETRICS & GYNECOLOGY

## 2024-11-01 PROCEDURE — 87086 URINE CULTURE/COLONY COUNT: CPT | Performed by: OBSTETRICS & GYNECOLOGY

## 2024-11-01 PROCEDURE — 3075F SYST BP GE 130 - 139MM HG: CPT | Performed by: OBSTETRICS & GYNECOLOGY

## 2024-11-01 PROCEDURE — 99213 OFFICE O/P EST LOW 20 MIN: CPT | Performed by: OBSTETRICS & GYNECOLOGY

## 2024-11-01 RX ORDER — NITROFURANTOIN 25; 75 MG/1; MG/1
100 CAPSULE ORAL 2 TIMES DAILY
Qty: 20 CAPSULE | Refills: 0 | Status: SHIPPED | OUTPATIENT
Start: 2024-11-01 | End: 2024-11-11

## 2024-11-01 RX ORDER — NITROFURANTOIN 25; 75 MG/1; MG/1
100 CAPSULE ORAL 2 TIMES DAILY
Qty: 14 CAPSULE | Refills: 0 | Status: SHIPPED | OUTPATIENT
Start: 2024-11-01 | End: 2024-11-01

## 2024-11-03 LAB — BACTERIA UR CULT: NORMAL

## 2024-11-04 LAB — BACTERIA UR CULT: NORMAL

## 2024-11-15 ENCOUNTER — APPOINTMENT (OUTPATIENT)
Dept: UROLOGY | Facility: CLINIC | Age: 64
End: 2024-11-15
Payer: MEDICARE

## 2024-11-15 VITALS
HEART RATE: 78 BPM | BODY MASS INDEX: 31.01 KG/M2 | DIASTOLIC BLOOD PRESSURE: 78 MMHG | SYSTOLIC BLOOD PRESSURE: 161 MMHG | WEIGHT: 169.6 LBS

## 2024-11-15 DIAGNOSIS — K62.3 RECTAL PROLAPSE: ICD-10-CM

## 2024-11-15 DIAGNOSIS — N39.41 URGE INCONTINENCE: ICD-10-CM

## 2024-11-15 DIAGNOSIS — R15.9 INCONTINENCE OF FECES, UNSPECIFIED FECAL INCONTINENCE TYPE: Primary | ICD-10-CM

## 2024-11-15 DIAGNOSIS — N81.6 RECTOCELE: ICD-10-CM

## 2024-11-15 DIAGNOSIS — K46.9 ENTEROCELE: ICD-10-CM

## 2024-11-15 LAB
POC APPEARANCE, URINE: CLEAR
POC BILIRUBIN, URINE: NEGATIVE
POC BLOOD, URINE: ABNORMAL
POC COLOR, URINE: YELLOW
POC GLUCOSE, URINE: NEGATIVE MG/DL
POC KETONES, URINE: NEGATIVE MG/DL
POC LEUKOCYTES, URINE: ABNORMAL
POC NITRITE,URINE: NEGATIVE
POC PH, URINE: 6.5 PH
POC PROTEIN, URINE: NEGATIVE MG/DL
POC SPECIFIC GRAVITY, URINE: 1.01
POC UROBILINOGEN, URINE: 0.2 EU/DL

## 2024-11-15 PROCEDURE — 1036F TOBACCO NON-USER: CPT | Performed by: OBSTETRICS & GYNECOLOGY

## 2024-11-15 PROCEDURE — 3077F SYST BP >= 140 MM HG: CPT | Performed by: OBSTETRICS & GYNECOLOGY

## 2024-11-15 PROCEDURE — 99213 OFFICE O/P EST LOW 20 MIN: CPT | Performed by: OBSTETRICS & GYNECOLOGY

## 2024-11-15 PROCEDURE — 3078F DIAST BP <80 MM HG: CPT | Performed by: OBSTETRICS & GYNECOLOGY

## 2024-11-15 PROCEDURE — 99417 PROLNG OP E/M EACH 15 MIN: CPT | Performed by: OBSTETRICS & GYNECOLOGY

## 2024-11-15 PROCEDURE — 81003 URINALYSIS AUTO W/O SCOPE: CPT | Performed by: OBSTETRICS & GYNECOLOGY

## 2024-11-15 NOTE — PROGRESS NOTES
Subjective   Patient ID: Freda Douglas is a 63 y.o. female who presents for No chief complaint on file..  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     The patient has been noting worsening rectal concerns.  She is having a lot of mucus and blood now with the rectal prolapse.  She thinks that the prolapse is out more now even without having a BM.      She has been utilizing Oxybutynin without her desired relief. She continues to note urgency, frequency and incontinence. She notes episodes of nocturia but denies any enuresis. She voids every 1-2 hour during the day and notes urgency as soon as she stands up.  Her UTI was appropriately treated. She denies any UTI like symptoms. We discussed PTNS and Revi wind device.     She denies any vaginal complaints, no abnormal bleeding or discharge.     She has no other complaints.        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     The patient was last seen 2 years ago and was lost to follow up. Her UA is grossly infected today and PVR is 212. She is noting worsening frequency and urgency with inability to empty her bladder completely. She denies any dysuria complaints.      She also underwent UDS testing 10/28/2024.     She denies any vaginal complaints, no abnormal bleeding or discharge.     She denies any bowel related complaints, no fecal or flatal incontinence.    She has no other complaints.    From Previous note  61-year-old presenting as a referral from Courtney Behmlander with complaints of  "urinary urgency and leakage.     The patient's history is complicated by what sounds like a cystocele repair in 1992 which improved her stress urinary incontinence complaints at that time. However 1 year later she was diagnosed with giant cell tumor of her sacrum and hip. This required several surgeries to completely debulk the tumor. The patient underwent chemotherapy and radiation. The patient states that after her multiple surgeries she did have some lower extremity changes in regards to balance and sensation.     At that time as well the patient developed the majority of her lower urinary tract symptoms. She has utilized oxybutynin and is presently on Solifenacin. She has seen several urologist in the past and states that she has undergone what sounds like urodynamics. She has never completed any third line therapies or beta 3 agonist therapy. She notes urgency every hour. She notes 1 episode of enuresis per month and notes 1 episode of nocturia. However she does episodically leak \"all the time\". She also notes leaking with laughing, coughing, and sneezing. She otherwise denies a history of nephrolithiasis, chronic urinary tract infections, or any gross hematuria.     The patient also suffers from fecal incontinence. She notes daily mucousy discharge with blood. She feels a bulge rectally when she has a bowel movement.     She denies any vaginal bulge complaints however. She denies any abnormal vaginal bleeding or discharge. She is not sexually active.     She has no other complaint    Review of Systems  Constitutional: No fever, No chills and No fatigue.   Eyes: No vision problems and No dryness of the eyes.   ENT: No dry mouth, No hearing loss and No nosebleeds.   Cardiovascular: No chest pain, No palpitations and No orthopnea.   Respiratory: No shortness of breath, No cough and No wheezing.   Gastrointestinal: No abdominal pain, No constipation, No nausea, No diarrhea, No vomiting and No melena. "   Genitourinary: As noted in HPI.   Musculoskeletal: No back pain, No myalgias, No muscle weakness, No joint swelling and No leg edema.   Integumentary: No rashes, No skin lesion and No itching.   Neurological: No headache, No numbness and No dizziness.   Psychiatric: No sleep disturbances, No anxiety and No depression.   Endocrine: No hot flashes, No loss of hair and No hirsutism.   Hematologic/Lymphatic: No swollen glands, No tendency for easy bleeding and No tendency for easy bruising.   All other systems have been reviewed and are negative for complaint.        Objective   Physical Exam  PHYSICAL EXAMINATION:  No LMP recorded.  There is no height or weight on file to calculate BMI.  There were no vitals taken for this visit.  General Appearance: well appearing  Neuro: Alert and oriented   HEENT: mucous membranes moist, neck supple  Resp: No respiratory distress, normal work of breathing  MSK: normal range of motion, gait appropriate    Urodynamics was performed 10/28/2024.    During the free uroflow the patient was noted to be a Valsalva voider with 128 cc voided volume and a PVR of 129 with sawtooth pattern with a maximum flow of 20 cc a second.    During the filling phase the patient was noted to have hypersensitivity with for sensation of 137 cc, first desire 158 cc, strong desire 194 cc and capacity at 225 cc.  There was no detrusor overactivity and there was no genuine stress urinary incontinence.  During the voiding phase the patient appeared to be a Valsalva voider with poor contraction of the detrusor muscle and she was unable to urinate with a PVR by catheterization of 447 cc.    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      #1 the patient has had significant improvements in her lower urinary tract symptoms following her treatment of her UTI with Macrobid.  However she does continue to note bothersome urinary urgency and frequency concerns.  The patient was provided a standing order for urinalysis and urine culture.  She could not provide enough urine today.    #2 we again discussed the complex nature of the patient's disease. We discussed her history of both radiation to the pelvis as well as debulking procedures in the sacral area. We discussed how from a neurologic standpoint this can significantly affect her urinary and defecatory systems.  Urodynamics performed 10/28/2024 noted incomplete bladder emptying with mild hypersensitivity.  Unfortunately, she likely has been suffering from a urinary tract infection for the month leading up to this evaluation.  PVR today 11/15/2024 is 0.  We therefore discussed the possibility of Revi, PTNS, and Botox.  She was provided information on these options and will contact the clinic on how she would like to proceed.     #3 we again discussed her vaginal prolapse. This does not appear to be symptomatic at this time. We discussed that the degree of her rectocele is quite minor compared to her enterocele.  She is most bothered by her rectal prolapse concerns and is following up with colorectal surgery to discuss her rectal prolapse.  She is proceeding with fiber therapy and titrating MiraLAX to a soft bowel movement every day to 2 days.     4.  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  This likely will prevent her from proceeding with InterStim.       6.  The patient will contact the clinic on how she would like to proceed with third line therapeutic options for her lower urinary tract complaints.     COMPA Calloway MD     Scribbrittnee Attestation  By signing my name below, Liane DÍAZ Scribe attest that this  documentation has been prepared under the direction and in the presence of Damon Calloway MD. All medical record entries made by the Scribe were at my direction or personally dictated by me. I have reviewed the chart and agree that the record accurately reflects my personal performance of the history, physical exam, discussion and plan.

## 2024-11-15 NOTE — PATIENT INSTRUCTIONS
We discussed PTNS, Revi device , Intradetrusor Botox for your lower urinary tract complaints.     You have been provided a standing order for urinalysis and urine culture. Should you have any UTI-like symptoms, please present immediately to any [facility] lab to drop off a urine sample.     Please stop all soaps and irritants vaginally.    Please start your vaginal estrogen therapy nightly for 2 weeks and then 3 times a week thereafter. Do not use the plastic applicator and only use your fingertip.      Please drop off a urine sample and you will be contacted with the results.    Call the clinic with questions or concerns.    [phone]

## 2024-11-18 ENCOUNTER — LAB (OUTPATIENT)
Dept: LAB | Facility: LAB | Age: 64
End: 2024-11-18
Payer: MEDICARE

## 2024-11-18 DIAGNOSIS — N39.41 URGE INCONTINENCE: ICD-10-CM

## 2024-11-18 LAB
APPEARANCE UR: CLEAR
BILIRUB UR STRIP.AUTO-MCNC: NEGATIVE MG/DL
COLOR UR: NORMAL
GLUCOSE UR STRIP.AUTO-MCNC: NORMAL MG/DL
HOLD SPECIMEN: NORMAL
KETONES UR STRIP.AUTO-MCNC: NEGATIVE MG/DL
LEUKOCYTE ESTERASE UR QL STRIP.AUTO: NEGATIVE
NITRITE UR QL STRIP.AUTO: NEGATIVE
PH UR STRIP.AUTO: 7 [PH]
PROT UR STRIP.AUTO-MCNC: NEGATIVE MG/DL
RBC # UR STRIP.AUTO: NEGATIVE /UL
SP GR UR STRIP.AUTO: 1.01
UROBILINOGEN UR STRIP.AUTO-MCNC: NORMAL MG/DL

## 2024-11-18 PROCEDURE — 81003 URINALYSIS AUTO W/O SCOPE: CPT

## 2024-12-02 DIAGNOSIS — I10 HYPERTENSION, UNSPECIFIED TYPE: ICD-10-CM

## 2024-12-02 NOTE — TELEPHONE ENCOUNTER
----- Message from Ishan Chandra sent at 11/28/2024 12:06 PM EST -----  Regarding: r  Last urine test was very clear and normal.  No blood protein bacteria or anything else clear and normal.  Any question call the office

## 2024-12-04 DIAGNOSIS — I10 HYPERTENSION, UNSPECIFIED TYPE: ICD-10-CM

## 2024-12-04 RX ORDER — TRIAMTERENE AND HYDROCHLOROTHIAZIDE 37.5; 25 MG/1; MG/1
1 CAPSULE ORAL EVERY MORNING
Qty: 90 CAPSULE | Refills: 3 | Status: SHIPPED | OUTPATIENT
Start: 2024-12-04 | End: 2025-12-04

## 2024-12-16 NOTE — PROGRESS NOTES
Freda Douglas is a 64 year old female referred by Angeles Harris NP for evaluation of rectal prolapse    History of a sacrectomy for a sacral mass at [facility] and again at  in the early 1990's.     The pathology was positive for cancer and she had to do radiation. After her 2nd surgery to remove the residual of the mass, she had to have a wound vac. She has had accidents of stool since the sacral mass was removed. . She has only had 1 accident/week since she has been taking the Metamucil otherwise it was daily. She tried Imodium but it did not help to bulk her up.     Is under the care of Dr. Calloway for urinary incontinence, vaginal prolapse, enterocele, and rectocele.  She has started 12 week PTNS and she is 3 weeks in - she has not noticed any difference yet.  She has cut back on drinking water to get up less at night.       She takes metamucil and miralax and if she did not have a dulcolax.  She has constant loose BM's now.    She usually can't have a BM without medications.  Initially after the surgery she had to intermittent self cath and had to do water enemas, and then she did medications to have a BM. She has issues having a BM since the surgery.  She would have fecal incontinence at least once a week after the surgery unexpectedly.       First noticed rectal prolapse January 2022, and now it is out all the time.  Mucous blood all the time.    She was suppose to have the prolapse repaired by Dr. Mondragon 2 years ago but wanted to hold off.    8/16/2022 Colonoscopy to cecum  The perianal and digital rectal examinations were normal.  The terminal ileum appeared normal.  Localized area of erythematous sloughing mucosa was present in the rectum. Biopsies were taken with a cold forceps for histology. The pathology specimen was placed into Bottle A.  Internal hemorrhoids were found during retroflexion. The hemorrhoids were small.  Pathology:   A.  RECTAL BIOPSY:    -- COLONIC MUCOSA WITH EROSIONS, GRANULATION  TISSUE FORMATION, ASSOCIATED ACUTE   AND CHRONIC INFLAMMATION AND REACTIVE CHANGE, SEE NOTE.   Note: There are rare small thrombi in the capsular vessels.  No dysplasia or viral inclusions are identified.       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   Urinary incontinence  HTN - difficult to control  GERD  Anxiety   Clawed toes     Surgical History  Sacrectomy and XRT to the area   T+A   Bladder suspension - didn't help  Tubal ligation     Social History  Smoking:  Never   ETOH: A couple of glasses of wine/day   Disabled since the sacrectomy    Family History  Mom: stroke,    Dad: Lung Ca   Brother: prostate CA  Brother: CHF  Daughter: HTN    Review of Systems  Constitutional: Negative for fever, chills, anorexia, weight loss, malaise     ENMT: Negative for nasal discharge, congestion, ear pain, mouth pain, throat pain     Respiratory: Negative for cough, hemoptysis, wheezing, shortness of breath     Cardiac: Negative for chest pain, dyspnea on exertion, orthopnea, palpitations, syncope     Gastrointestinal: Negative for nausea, vomiting, diarrhea, constipation, abdominal pain, (+)RECTAL PROLAPSE, (+)GERM CELL TUMOR OF SACRUM, S/P EXCISION    Genitourinary: Negative for discharge, dysuria, flank pain, frequency, hematuria, (+)URINARY INCONTINENCE    Musculoskeletal: Negative for decreased ROM, pain, swelling, weakness     Neurological: Negative for dizziness, confusion, headache, seizures, syncope     Psychiatric: Negative for mood changes, anxiety, hallucinations, sleep changes, suicidal ideas     Skin: Negative for mass, pain, itching, rash, ulcer     Endocrine: Negative for heat intolerance, cold intolerance, excessive sweating, polyuria, excess thirst     Hematologic/Lymph: Negative for anemia, bruising, easy bleeding, night sweats, petechiae, history of DVT/PE or cancer     Allergic/Immunologic: Negative for anaphylaxis, itchy/ teary eyes, itching,  sneezing, swelling    Physical Exam  Constitutional: Well developed, awake/alert/oriented x3, no distress, alert and cooperative             Eyes: Sclera anicteric, no conjunctival inflammation, conjugate gaze    ENMT: mucous membranes moist, no apparent injury,            Head/Neck: Neck supple, no apparent injury, No JVD, trachea midline, no bruits              Respiratory/Thorax: Patent airways, CTAB, normal breath sounds with good chest expansion, thorax symmetric         Cardiovascular: Regular, rate and rhythm, no murmurs, normal S1 and S2         Gastrointestinal: Nondistended, soft, non-tender, no rebound tenderness or guarding, no masses palpable, no organomegaly, +BS, no bruits               Extremities: normal extremities, no cyanosis edema, contusions or wounds, 2+ femoral pulses B/L              Neurological: alert and oriented x3, normal strength, Normal gait          Lymphatic: No palpable inguinal lymphadenopathy   Psychological: Appropriate mood and behavior         Skin: Warm and dry, no lesions, no rashes                Anorectal: There is mucous on the skin, the skin otherwise appears normal.  Gaping anus,     Impression: Pt s/p sacrectomy for tumor and XRT with no anal tone and rectal prolapse and severe fecal incontinence for years with mixed urinary incontinence as well since her surgery.    Since we are unable to address the the pelvic floor dysfunction as that is surgical I think that recurrent prolapse and continued incontinence is likely.   We discussed things like SNS being a possibility, but unlikely to work with her sacrectomy (as S4 has been resected)     Plan:  CT scan to evaluate her anatomy as she has not had one in >10 years and for abdominal pain/constipation.   Discussed rectopexy vs proctectomy and end colostomy  Stoma nurse referral   She will be unable to do the bowel prep perioperatively.    Continue metamucil.

## 2024-12-17 ENCOUNTER — APPOINTMENT (OUTPATIENT)
Dept: UROLOGY | Facility: CLINIC | Age: 64
End: 2024-12-17
Payer: MEDICARE

## 2024-12-17 VITALS
WEIGHT: 168 LBS | TEMPERATURE: 97.8 F | BODY MASS INDEX: 29.77 KG/M2 | SYSTOLIC BLOOD PRESSURE: 141 MMHG | HEIGHT: 63 IN | HEART RATE: 69 BPM | DIASTOLIC BLOOD PRESSURE: 79 MMHG

## 2024-12-17 DIAGNOSIS — N39.41 URGE INCONTINENCE: ICD-10-CM

## 2024-12-17 DIAGNOSIS — N32.81 OAB (OVERACTIVE BLADDER): Primary | ICD-10-CM

## 2024-12-17 PROCEDURE — 99203 OFFICE O/P NEW LOW 30 MIN: CPT | Performed by: PHYSICIAN ASSISTANT

## 2024-12-17 PROCEDURE — 64566 NEUROELTRD STIM POST TIBIAL: CPT | Performed by: PHYSICIAN ASSISTANT

## 2024-12-17 ASSESSMENT — PAIN SCALES - GENERAL: PAINLEVEL_OUTOF10: 0-NO PAIN

## 2024-12-17 ASSESSMENT — ENCOUNTER SYMPTOMS: FREQUENCY: 1

## 2024-12-17 NOTE — PROGRESS NOTES
Subjective   Patient ID: Freda Douglas is a 64 y.o. female who presents for No chief complaint on file..  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 She reports nocturia x2.       I discussed with patient tibial nerve stimulation is a treatment for overactive bladder which will improve urinary frequency urgency and urge incontinence.  I discussed it will not improve her stress incontinence.  I discussed the use of a small needle placed behind the ankle which stimulates the tibial nerve, then the sacral nerve plexus which controlled the bladder nurse function.    I discussed the treatments are 12 initial treatment lasting 30-minute treatments scheduled a week apart.  I discussed if she is having good results with the initial treatment she might benefit from maintenance PTNS treatments.    Patient denies implantable pacemakers or defibrillators.  She denies diagnosis of nerve damage.    Patient ID: Freda Douglas is a 64 y.o. female.    Percutaneous Tibial Nerve Stimulation    Date/Time: 12/17/2024 1:47 PM    Performed by: Josefina Gómez PA-C  Authorized by: Josefina Gómez PA-C    Procedure Details     Indications: urge incontinence, urinary frequency and urinary urgency      PTNS session #: 1    Bladder symptoms: unchanged      Ankle used: left      Stimulator intensity (mA): 6            Review of Systems   Genitourinary:  Positive for frequency and urgency.   All other systems reviewed and are negative.      Objective   Physical Exam  Constitutional:       General: She is not in acute distress.     Appearance: Normal appearance.   HENT:      Head:  Normocephalic and atraumatic.      Nose: Nose normal.      Mouth/Throat:      Mouth: Mucous membranes are dry.   Cardiovascular:      Rate and Rhythm: Normal rate.   Pulmonary:      Effort: Pulmonary effort is normal.   Abdominal:      General: Abdomen is flat.      Palpations: Abdomen is soft.   Musculoskeletal:         General: Normal range of motion.      Cervical back: Normal range of motion and neck supple.   Skin:     General: Skin is warm and dry.   Neurological:      General: No focal deficit present.      Mental Status: She is alert and oriented to person, place, and time.   Psychiatric:         Mood and Affect: Mood normal.         Assessment/Plan     OAB  Patient tolerated procedure well.   I discussed it can take up to 8 weeks to notice improvement of symptoms  Continue Oxybutynin.     Follow up next week for next PTNS.          Josefina Gómez PA-C 12/17/24 11:04 AM

## 2024-12-24 ENCOUNTER — APPOINTMENT (OUTPATIENT)
Dept: UROLOGY | Facility: CLINIC | Age: 64
End: 2024-12-24
Payer: MEDICARE

## 2024-12-24 VITALS — HEIGHT: 63 IN | BODY MASS INDEX: 29.73 KG/M2 | WEIGHT: 167.8 LBS | TEMPERATURE: 96.8 F

## 2024-12-24 DIAGNOSIS — N32.81 OAB (OVERACTIVE BLADDER): Primary | ICD-10-CM

## 2024-12-24 DIAGNOSIS — N39.41 URGE INCONTINENCE: ICD-10-CM

## 2024-12-24 PROCEDURE — 64566 NEUROELTRD STIM POST TIBIAL: CPT | Performed by: PHYSICIAN ASSISTANT

## 2024-12-24 ASSESSMENT — ENCOUNTER SYMPTOMS: FREQUENCY: 1

## 2024-12-24 ASSESSMENT — PAIN SCALES - GENERAL: PAINLEVEL_OUTOF10: 0-NO PAIN

## 2024-12-24 NOTE — PROGRESS NOTES
Subjective   Patient ID: Freda Douglas is a 64 y.o. female who presents for PTNS.  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 She reports urgency incontinence with every void.  She reports nocturia x2.     Patient ID: Freda Douglas is a 64 y.o. female.    Percutaneous Tibial Nerve Stimulation    Date/Time: 12/24/2024 11:25 AM    Performed by: Josefina Gómez PA-C  Authorized by: Josefina Gómez PA-C    Procedure Details     Indications: urge incontinence, urinary frequency and urinary urgency      PTNS session #: 2    Bladder symptoms: unchanged      Ankle used: left      Stimulator intensity (mA): 6            Review of Systems   Genitourinary:  Positive for frequency and urgency.   All other systems reviewed and are negative.      Objective   Physical Exam  Constitutional:       General: She is not in acute distress.     Appearance: Normal appearance.   HENT:      Head: Normocephalic and atraumatic.      Nose: Nose normal.      Mouth/Throat:      Mouth: Mucous membranes are dry.   Cardiovascular:      Rate and Rhythm: Normal rate.   Pulmonary:      Effort: Pulmonary effort is normal.   Abdominal:      General: Abdomen is flat.      Palpations: Abdomen is soft.   Musculoskeletal:         General: Normal range of motion.      Cervical back: Normal range of motion and neck supple.   Skin:     General: Skin is warm and dry.   Neurological:      General: No focal deficit present.      Mental Status: She is alert and oriented to person, place, and time.   Psychiatric:         Mood and Affect: Mood normal.         Assessment/Plan     OAB  Patient tolerated  procedure well.   I discussed it can take up to 8 weeks to notice improvement of symptoms  Continue Oxybutynin.     Follow up next week for next PTNS.          Josefina Gómez PA-C 12/24/24 11:25 AM

## 2024-12-31 ENCOUNTER — APPOINTMENT (OUTPATIENT)
Dept: UROLOGY | Facility: CLINIC | Age: 64
End: 2024-12-31
Payer: MEDICARE

## 2024-12-31 VITALS — TEMPERATURE: 96.5 F

## 2024-12-31 DIAGNOSIS — N32.81 OAB (OVERACTIVE BLADDER): Primary | ICD-10-CM

## 2024-12-31 DIAGNOSIS — N39.41 URGE INCONTINENCE: ICD-10-CM

## 2024-12-31 PROCEDURE — 64566 NEUROELTRD STIM POST TIBIAL: CPT | Performed by: PHYSICIAN ASSISTANT

## 2024-12-31 ASSESSMENT — ENCOUNTER SYMPTOMS: FREQUENCY: 1

## 2024-12-31 ASSESSMENT — PAIN SCALES - GENERAL: PAINLEVEL_OUTOF10: 0-NO PAIN

## 2024-12-31 NOTE — PROGRESS NOTES
Subjective   Patient ID: Freda Douglas is a 64 y.o. female who presents for nerve stimulation  (PTNS).  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 She reports urgency incontinence with every void.  She reports nocturia x2.     Patient ID: Freda Douglas is a 64 y.o. female.    Percutaneous Tibial Nerve Stimulation    Date/Time: 12/31/2024 10:17 AM    Performed by: Josefina Gómez PA-C  Authorized by: Josefina Gómez PA-C    Procedure Details     Indications: urge incontinence, urinary frequency and urinary urgency      PTNS session #: 3    Bladder symptoms: unchanged      Ankle used: left      Stimulator intensity (mA): 8            Review of Systems   Genitourinary:  Positive for frequency and urgency.   All other systems reviewed and are negative.      Objective   Physical Exam  Constitutional:       General: She is not in acute distress.     Appearance: Normal appearance.   HENT:      Head: Normocephalic and atraumatic.      Nose: Nose normal.      Mouth/Throat:      Mouth: Mucous membranes are dry.   Cardiovascular:      Rate and Rhythm: Normal rate.   Pulmonary:      Effort: Pulmonary effort is normal.   Abdominal:      General: Abdomen is flat.      Palpations: Abdomen is soft.   Musculoskeletal:         General: Normal range of motion.      Cervical back: Normal range of motion and neck supple.   Skin:     General: Skin is warm and dry.   Neurological:      General: No focal deficit present.      Mental Status: She is alert and oriented to person, place, and time.   Psychiatric:         Mood and Affect: Mood normal.         Assessment/Plan      OAB  Patient tolerated procedure well.   I discussed it can take up to 8 weeks to notice improvement of symptoms  Continue Oxybutynin.     Follow up next week for next PTNS.          Josefina Gómez PA-C 12/31/24 10:17 AM

## 2025-01-07 ENCOUNTER — APPOINTMENT (OUTPATIENT)
Dept: UROLOGY | Facility: CLINIC | Age: 65
End: 2025-01-07
Payer: MEDICARE

## 2025-01-07 VITALS
DIASTOLIC BLOOD PRESSURE: 80 MMHG | TEMPERATURE: 96.5 F | BODY MASS INDEX: 29.95 KG/M2 | WEIGHT: 169 LBS | HEART RATE: 71 BPM | SYSTOLIC BLOOD PRESSURE: 152 MMHG | HEIGHT: 63 IN

## 2025-01-07 DIAGNOSIS — N32.81 OAB (OVERACTIVE BLADDER): Primary | ICD-10-CM

## 2025-01-07 PROCEDURE — 64566 NEUROELTRD STIM POST TIBIAL: CPT | Performed by: PHYSICIAN ASSISTANT

## 2025-01-07 ASSESSMENT — ENCOUNTER SYMPTOMS: FREQUENCY: 1

## 2025-01-07 ASSESSMENT — PAIN SCALES - GENERAL: PAINLEVEL_OUTOF10: 0-NO PAIN

## 2025-01-07 NOTE — PROGRESS NOTES
Subjective   Patient ID: Freda Douglas is a 64 y.o. female who presents for Incontinence of feces, unspecified fecal incontinence type (ptns).  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 She reports urgency incontinence with every void.  She reports nocturia x2.     Patient ID: Freda Douglas is a 64 y.o. female.    Percutaneous Tibial Nerve Stimulation    Date/Time: 1/7/2025 12:24 PM    Performed by: Josefina Gómez PA-C  Authorized by: Josefina Gómez PA-C    Procedure Details     Indications: urge incontinence, urinary frequency and urinary urgency      PTNS session #: 4    Bladder symptoms: unchanged      Ankle used: left      Stimulator intensity (mA): 8            Review of Systems   Genitourinary:  Positive for frequency and urgency.   All other systems reviewed and are negative.      Objective   Physical Exam  Constitutional:       General: She is not in acute distress.     Appearance: Normal appearance.   HENT:      Head: Normocephalic and atraumatic.      Nose: Nose normal.      Mouth/Throat:      Mouth: Mucous membranes are dry.   Cardiovascular:      Rate and Rhythm: Normal rate.   Pulmonary:      Effort: Pulmonary effort is normal.   Abdominal:      General: Abdomen is flat.      Palpations: Abdomen is soft.   Musculoskeletal:         General: Normal range of motion.      Cervical back: Normal range of motion and neck supple.   Skin:     General: Skin is warm and dry.   Neurological:      General: No focal deficit present.      Mental Status: She is alert and oriented to person, place, and time.   Psychiatric:         Mood and Affect: Mood normal.          Assessment/Plan     OAB  Patient tolerated procedure well.   I discussed it can take up to 8 weeks to notice improvement of symptoms  Continue Oxybutynin.     Follow up next week for next PTNS.          Josefina Gómez PA-C 01/07/25 12:24 PM

## 2025-01-08 ENCOUNTER — OFFICE VISIT (OUTPATIENT)
Dept: SURGERY | Facility: CLINIC | Age: 65
End: 2025-01-08
Payer: MEDICARE

## 2025-01-08 VITALS
BODY MASS INDEX: 29.58 KG/M2 | DIASTOLIC BLOOD PRESSURE: 78 MMHG | TEMPERATURE: 96.3 F | OXYGEN SATURATION: 97 % | SYSTOLIC BLOOD PRESSURE: 134 MMHG | HEART RATE: 84 BPM | WEIGHT: 167 LBS

## 2025-01-08 DIAGNOSIS — K62.3 RECTAL PROLAPSE: Primary | ICD-10-CM

## 2025-01-08 DIAGNOSIS — R15.2 INCONTINENCE OF FECES WITH FECAL URGENCY: ICD-10-CM

## 2025-01-08 DIAGNOSIS — R15.9 INCONTINENCE OF FECES WITH FECAL URGENCY: ICD-10-CM

## 2025-01-08 PROCEDURE — 99214 OFFICE O/P EST MOD 30 MIN: CPT | Performed by: COLON & RECTAL SURGERY

## 2025-01-08 PROCEDURE — 1126F AMNT PAIN NOTED NONE PRSNT: CPT | Performed by: COLON & RECTAL SURGERY

## 2025-01-08 PROCEDURE — 3078F DIAST BP <80 MM HG: CPT | Performed by: COLON & RECTAL SURGERY

## 2025-01-08 PROCEDURE — 3075F SYST BP GE 130 - 139MM HG: CPT | Performed by: COLON & RECTAL SURGERY

## 2025-01-08 ASSESSMENT — PAIN SCALES - GENERAL: PAINLEVEL_OUTOF10: 0-NO PAIN

## 2025-01-08 NOTE — CONSULTS
Ostomy/Wound Care Consult     Visit Date: 1/8/2025      Patient Name: Freda Douglas         MRN: [id number]           YOB: 1960     Outcome: Preoperative education and kyle completed for Colostomy    Abdomen assessed in sitting, standing, and lying position.   Kyle made in LUQ using [employer] Tattoo    Abdominal creases and scars were avoided. Lower abdominal fat mound not large enough for stoma siting.    Education provided:  Stoma Appearance  Purpose of pouch  Post-operative diet  Expectations for post-operative care/teaching by [facility] RN    Showed samples of pouches    Materials provided: pre-op educational packet    Patient Response: Verbalized understanding, had appropriate questions, all of which were addressed.    Time Increment: 45 minutes    Vinita OCAMPON, RN, CWOCN  1/8/2025  11:48 AM

## 2025-01-09 ENCOUNTER — LAB (OUTPATIENT)
Dept: LAB | Facility: LAB | Age: 65
End: 2025-01-09
Payer: MEDICARE

## 2025-01-09 DIAGNOSIS — K59.00 CONSTIPATION, UNSPECIFIED CONSTIPATION TYPE: ICD-10-CM

## 2025-01-09 DIAGNOSIS — R10.84 GENERALIZED ABDOMINAL PAIN: ICD-10-CM

## 2025-01-09 DIAGNOSIS — K62.3 RECTAL PROLAPSE: ICD-10-CM

## 2025-01-09 LAB
CREAT SERPL-MCNC: 0.86 MG/DL (ref 0.5–1.05)
EGFRCR SERPLBLD CKD-EPI 2021: 75 ML/MIN/1.73M*2

## 2025-01-09 PROCEDURE — 82565 ASSAY OF CREATININE: CPT

## 2025-01-09 RX ORDER — METRONIDAZOLE 250 MG/1
TABLET ORAL
Qty: 3 TABLET | Refills: 0 | Status: SHIPPED | OUTPATIENT
Start: 2025-01-09

## 2025-01-09 RX ORDER — CHLORHEXIDINE GLUCONATE ORAL RINSE 1.2 MG/ML
SOLUTION DENTAL
Qty: 120 ML | Refills: 0 | Status: SHIPPED | OUTPATIENT
Start: 2025-01-09

## 2025-01-09 RX ORDER — NEOMYCIN SULFATE 500 MG/1
TABLET ORAL
Qty: 6 TABLET | Refills: 0 | Status: SHIPPED | OUTPATIENT
Start: 2025-01-09

## 2025-01-09 RX ORDER — GABAPENTIN 100 MG/1
CAPSULE ORAL
Qty: 3 CAPSULE | Refills: 0 | Status: SHIPPED | OUTPATIENT
Start: 2025-01-09

## 2025-01-13 ENCOUNTER — HOSPITAL ENCOUNTER (OUTPATIENT)
Dept: RADIOLOGY | Facility: CLINIC | Age: 65
Discharge: HOME | End: 2025-01-13
Payer: MEDICARE

## 2025-01-13 DIAGNOSIS — K59.00 CONSTIPATION, UNSPECIFIED CONSTIPATION TYPE: ICD-10-CM

## 2025-01-13 DIAGNOSIS — R10.84 GENERALIZED ABDOMINAL PAIN: ICD-10-CM

## 2025-01-13 PROCEDURE — 74177 CT ABD & PELVIS W/CONTRAST: CPT

## 2025-01-13 PROCEDURE — 74177 CT ABD & PELVIS W/CONTRAST: CPT | Performed by: RADIOLOGY

## 2025-01-13 PROCEDURE — 2550000001 HC RX 255 CONTRASTS: Performed by: COLON & RECTAL SURGERY

## 2025-01-13 RX ADMIN — IOHEXOL 75 ML: 350 INJECTION, SOLUTION INTRAVENOUS at 11:41

## 2025-01-14 ENCOUNTER — APPOINTMENT (OUTPATIENT)
Dept: UROLOGY | Facility: CLINIC | Age: 65
End: 2025-01-14
Payer: MEDICARE

## 2025-01-14 VITALS
TEMPERATURE: 95.2 F | WEIGHT: 169 LBS | HEIGHT: 63 IN | SYSTOLIC BLOOD PRESSURE: 162 MMHG | DIASTOLIC BLOOD PRESSURE: 81 MMHG | HEART RATE: 99 BPM | BODY MASS INDEX: 29.95 KG/M2

## 2025-01-14 DIAGNOSIS — N32.81 OAB (OVERACTIVE BLADDER): Primary | ICD-10-CM

## 2025-01-14 PROCEDURE — 64566 NEUROELTRD STIM POST TIBIAL: CPT | Performed by: PHYSICIAN ASSISTANT

## 2025-01-14 ASSESSMENT — ENCOUNTER SYMPTOMS: FREQUENCY: 1

## 2025-01-14 ASSESSMENT — PAIN SCALES - GENERAL: PAINLEVEL_OUTOF10: 0-NO PAIN

## 2025-01-14 NOTE — PROGRESS NOTES
Subjective   Patient ID: Freda Douglas is a 64 y.o. female who presents for overactive bladder (ptns).  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        Patient ID: Freda Douglas is a 64 y.o. female.    Percutaneous Tibial Nerve Stimulation    Date/Time: 1/14/2025 10:15 AM    Performed by: Josefina Gómez PA-C  Authorized by: Josefina Gómez PA-C    Procedure Details     Indications: urge incontinence, urinary frequency and urinary urgency      PTNS session #: 5    Bladder symptoms: improved      Ankle used: left      Stimulator intensity (mA): 10            Review of Systems   Genitourinary:  Positive for frequency and urgency.   All other systems reviewed and are negative.      Objective   Physical Exam  Constitutional:       General: She is not in acute distress.     Appearance: Normal appearance.   HENT:      Head: Normocephalic and atraumatic.      Nose: Nose normal.      Mouth/Throat:      Mouth: Mucous membranes are dry.   Cardiovascular:      Rate and Rhythm: Normal rate.   Pulmonary:      Effort: Pulmonary effort is normal.   Abdominal:      General: Abdomen is flat.      Palpations: Abdomen is soft.   Musculoskeletal:         General: Normal range of motion.      Cervical back: Normal range of motion and neck supple.   Skin:     General: Skin is warm and dry.   Neurological:      General: No focal deficit present.      Mental Status: She is alert and oriented to person, place, and time.   Psychiatric:         Mood and Affect: Mood normal.       Assessment/Plan     OAB  Patient tolerated procedure well.    Patient reports slight improvement of urinary frequency and incontinence.   Continue Oxybutynin.     Follow up next week for next PTNS.          Josefina Gómez PA-C 01/14/25 10:15 AM

## 2025-01-21 ENCOUNTER — APPOINTMENT (OUTPATIENT)
Dept: UROLOGY | Facility: CLINIC | Age: 65
End: 2025-01-21
Payer: MEDICARE

## 2025-01-25 DIAGNOSIS — I10 HYPERTENSION, UNSPECIFIED TYPE: ICD-10-CM

## 2025-01-28 ENCOUNTER — APPOINTMENT (OUTPATIENT)
Dept: UROLOGY | Facility: CLINIC | Age: 65
End: 2025-01-28
Payer: MEDICARE

## 2025-01-28 VITALS — TEMPERATURE: 97 F

## 2025-01-28 DIAGNOSIS — N39.41 URGE INCONTINENCE: ICD-10-CM

## 2025-01-28 DIAGNOSIS — N32.81 OAB (OVERACTIVE BLADDER): Primary | ICD-10-CM

## 2025-01-28 PROCEDURE — 64566 NEUROELTRD STIM POST TIBIAL: CPT | Performed by: PHYSICIAN ASSISTANT

## 2025-01-28 ASSESSMENT — PAIN SCALES - GENERAL: PAINLEVEL_OUTOF10: 0-NO PAIN

## 2025-01-28 ASSESSMENT — ENCOUNTER SYMPTOMS: FREQUENCY: 1

## 2025-01-28 NOTE — PROGRESS NOTES
Subjective   Patient ID: Freda Douglas is a 64 y.o. female who presents for Overactive Bladder.  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 She reports increased urinary frequency and urgency.       Patient ID: Freda Douglas is a 64 y.o. female.    Percutaneous Tibial Nerve Stimulation    Date/Time: 1/28/2025 10:49 AM    Performed by: Josefina Gómez PA-C  Authorized by: Josefina Gómez PA-C    Procedure Details     Indications: urge incontinence, urinary frequency and urinary urgency      PTNS session #: 6    Bladder symptoms: improved      Ankle used: left      Stimulator intensity (mA): 4            Review of Systems   Genitourinary:  Positive for frequency and urgency.   All other systems reviewed and are negative.      Objective   Physical Exam  Constitutional:       General: She is not in acute distress.     Appearance: Normal appearance.   HENT:      Head: Normocephalic and atraumatic.      Nose: Nose normal.      Mouth/Throat:      Mouth: Mucous membranes are dry.   Cardiovascular:      Rate and Rhythm: Normal rate.   Pulmonary:      Effort: Pulmonary effort is normal.   Abdominal:      General: Abdomen is flat.      Palpations: Abdomen is soft.   Musculoskeletal:         General: Normal range of motion.      Cervical back: Normal range of motion and neck supple.   Skin:     General: Skin is warm and dry.   Neurological:      General: No focal deficit present.      Mental Status: She is alert and oriented to person, place, and time.   Psychiatric:         Mood and Affect: Mood normal.         Assessment/Plan     OAB  Patient tolerated procedure well.   She is  noticing improved urinary symptoms with PTNS especially after her symptoms worsened after missing last week.    Will continue weekly treatments.     Continue Oxybutynin.     Follow up next week for next PTNS.          Josefina Gómez PA-C 01/28/25 10:49 AM

## 2025-01-31 DIAGNOSIS — I10 HYPERTENSION, UNSPECIFIED TYPE: ICD-10-CM

## 2025-01-31 RX ORDER — CLONIDINE HYDROCHLORIDE 0.1 MG/1
0.1 TABLET ORAL 4 TIMES DAILY
Qty: 360 TABLET | Refills: 3 | Status: SHIPPED | OUTPATIENT
Start: 2025-01-31 | End: 2026-01-31

## 2025-01-31 RX ORDER — CLONIDINE HYDROCHLORIDE 0.1 MG/1
0.1 TABLET ORAL 4 TIMES DAILY
Qty: 360 TABLET | Refills: 3 | Status: SHIPPED | OUTPATIENT
Start: 2025-01-31

## 2025-02-04 ENCOUNTER — APPOINTMENT (OUTPATIENT)
Dept: UROLOGY | Facility: CLINIC | Age: 65
End: 2025-02-04
Payer: MEDICARE

## 2025-02-04 VITALS — TEMPERATURE: 96.8 F

## 2025-02-04 DIAGNOSIS — N32.81 OAB (OVERACTIVE BLADDER): Primary | ICD-10-CM

## 2025-02-04 PROCEDURE — 64566 NEUROELTRD STIM POST TIBIAL: CPT | Performed by: PHYSICIAN ASSISTANT

## 2025-02-04 ASSESSMENT — ENCOUNTER SYMPTOMS: FREQUENCY: 1

## 2025-02-04 ASSESSMENT — PAIN SCALES - GENERAL: PAINLEVEL_OUTOF10: 0-NO PAIN

## 2025-02-04 NOTE — PROGRESS NOTES
Subjective   Patient ID: Freda Douglas is a 64 y.o. female who presents for Overactive Bladder (PTNS).  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 She reports increased urinary frequency and urgency.       Patient ID: Freda Douglas is a 64 y.o. female.    Percutaneous Tibial Nerve Stimulation    Date/Time: 2/4/2025 1:28 PM    Performed by: Josefina Gómez PA-C  Authorized by: Josefina Gómez PA-C    Procedure Details     Indications: urge incontinence, urinary frequency and urinary urgency      PTNS session #: 7    Bladder symptoms: improved      Ankle used: left      Stimulator intensity (mA): 3            Review of Systems   Genitourinary:  Positive for frequency and urgency.   All other systems reviewed and are negative.      Objective   Physical Exam  Constitutional:       General: She is not in acute distress.     Appearance: Normal appearance.   HENT:      Head: Normocephalic and atraumatic.      Nose: Nose normal.      Mouth/Throat:      Mouth: Mucous membranes are dry.   Cardiovascular:      Rate and Rhythm: Normal rate.   Pulmonary:      Effort: Pulmonary effort is normal.   Abdominal:      General: Abdomen is flat.      Palpations: Abdomen is soft.   Musculoskeletal:         General: Normal range of motion.      Cervical back: Normal range of motion and neck supple.   Skin:     General: Skin is warm and dry.   Neurological:      General: No focal deficit present.      Mental Status: She is alert and oriented to person, place, and time.   Psychiatric:         Mood and Affect: Mood normal.         Assessment/Plan     OAB  Patient tolerated procedure well.   She is  noticing improved urinary symptoms with PTNS    Will continue weekly treatments.     Continue Oxybutynin.     Follow up next week for next PTNS.          Josefina Gómez PA-C 02/04/25 1:28 PM

## 2025-02-11 ENCOUNTER — APPOINTMENT (OUTPATIENT)
Dept: UROLOGY | Facility: CLINIC | Age: 65
End: 2025-02-11
Payer: MEDICARE

## 2025-02-11 VITALS — HEIGHT: 63 IN | WEIGHT: 168.4 LBS | BODY MASS INDEX: 29.84 KG/M2 | TEMPERATURE: 96.4 F

## 2025-02-11 DIAGNOSIS — N32.81 OAB (OVERACTIVE BLADDER): Primary | ICD-10-CM

## 2025-02-11 PROCEDURE — 64566 NEUROELTRD STIM POST TIBIAL: CPT | Performed by: PHYSICIAN ASSISTANT

## 2025-02-11 ASSESSMENT — ENCOUNTER SYMPTOMS: FREQUENCY: 1

## 2025-02-11 ASSESSMENT — PAIN SCALES - GENERAL: PAINLEVEL_OUTOF10: 6

## 2025-02-11 NOTE — PROGRESS NOTES
Subjective   Patient ID: Freda Douglas is a 64 y.o. female who presents for Urine Leakage (PTNS).  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       Patient ID: Freda Douglas is a 64 y.o. female.    Percutaneous Tibial Nerve Stimulation    Date/Time: 2/11/2025 10:27 AM    Performed by: Josefina Gómez PA-C  Authorized by: Josefina Gómez PA-C    Procedure Details     Indications: urge incontinence, urinary frequency and urinary urgency      PTNS session #: 8    Bladder symptoms: improved      Ankle used: left      Stimulator intensity (mA): 1            Review of Systems   Genitourinary:  Positive for frequency and urgency.   All other systems reviewed and are negative.      Objective   Physical Exam  Constitutional:       General: She is not in acute distress.     Appearance: Normal appearance.   HENT:      Head: Normocephalic and atraumatic.      Nose: Nose normal.      Mouth/Throat:      Mouth: Mucous membranes are dry.   Cardiovascular:      Rate and Rhythm: Normal rate.   Pulmonary:      Effort: Pulmonary effort is normal.   Abdominal:      General: Abdomen is flat.      Palpations: Abdomen is soft.   Musculoskeletal:         General: Normal range of motion.      Cervical back: Normal range of motion and neck supple.   Skin:     General: Skin is warm and dry.   Neurological:      General: No focal deficit present.      Mental Status: She is alert and oriented to person, place, and time.   Psychiatric:         Mood and Affect: Mood normal.         Assessment/Plan     OAB  Patient tolerated procedure  well.   She is noticing improved urinary symptoms with PTNS    Will continue weekly treatments.     Continue Oxybutynin.     Follow up next week for next PTNS.          Josefina Gómez PA-C 02/11/25 10:27 AM

## 2025-02-12 ENCOUNTER — CLINICAL SUPPORT (OUTPATIENT)
Dept: PREADMISSION TESTING | Facility: HOSPITAL | Age: 65
End: 2025-02-12
Payer: MEDICARE

## 2025-02-12 DIAGNOSIS — K62.3 RECTAL PROLAPSE: ICD-10-CM

## 2025-02-12 NOTE — CPM/PAT NURSE NOTE
CPM/PAT Nurse Note      Name: Freda Douglas (Freda Douglas)  /Age: 1960/64 y.o.       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    Family History   Problem Relation Name Age of Onset    Multiple sclerosis Mother Thu Garner     Hypertension Mother Thu Garner     Stroke Mother Thu Garner     Cancer Father Lebron Garner     Hypertension Father Lebron Garner     Cancer Brother Lebron Garner     Hypertension Brother Lebron Garner     Heart disease Brother Rashaun Garner     Miscarriages / Stillbirths Maternal Grandmother Qing Hernandez        Allergies   Allergen Reactions    Sulfa (Sulfonamide Antibiotics) Hives       Prior to Admission medications    Medication Sig Start Date End Date Taking? Authorizing Provider   atorvastatin (Lipitor) 20 mg tablet Take 1 tablet (20 mg) by mouth once daily. 24  Ishan Chandra MD   chlorhexidine (Peridex) 0.12 % solution Rinse mouth with 15 ml after toothbrushing the night before surgery and on the morning of surgery.  Expectorate after rinsing.  Do not swallow.  1/9/25   Rosie Frias MD   cloNIDine (Catapres) 0.1 mg tablet Take 1 tablet (0.1 mg) by mouth 4 times a day. 1/31/25 1/31/26  Ishan Chandra MD   gabapentin (Neurontin) 100 mg capsule Take one capsule by mouth starting three days before surgery at bedtime. 1/9/25   Rosie Frias MD   metroNIDAZOLE (Flagyl) 250 mg tablet Take one tablet at 6p, 7p, and 11p the night before surgery. 1/9/25   Rosie Frias MD   neomycin (Mycifradin) 500 mg tablet Take two tabs by mouth at 6p, 7pm, and 11p the night before surgery. 1/9/25   Rosie Frias MD   omeprazole (PriLOSEC) 20 mg DR capsule TAKE 1 CAPSULE DAILY. DO   NOT CRUSH OR CHEW 9/30/24   Ishan Chandra MD   oxybutynin (Ditropan) 5 mg tablet TAKE 1 TABLET TWICE A DAY 9/4/24   Ishan Chandra MD   triamterene-hydrochlorothiazid (Dyazide) 37.5-25 mg capsule TAKE 1 CAPSULE BY MOUTH ONCE DAILY IN THE MORNING. 12/4/24 12/4/25  Ishan Chandra MD   triamterene-hydrochlorothiazid (Dyazide) 37.5-25 mg capsule Take 1 capsule by mouth once daily in the morning. 12/4/24 12/4/25  Ishan Chandra MD   verapamil SR (Calan-SR) 240 mg ER tablet Take 1 tablet (240 mg) by mouth once daily. 2/14/24 2/13/25  Ishan Chandra MD   cloNIDine (Catapres) 0.1 mg tablet TAKE ONE TABLET BY MOUTH FOUR TIMES A DAY 1/31/25 2/12/25  Ishan Chandra MD        PAT ROS     DASI Risk Score      Flowsheet Row Questionnaire Series Submission from 1/16/2025 in [facility] Care with Generic Provider Mo   Can you take care of yourself (eat, dress, bathe, or use toilet)?  2.75  filed at 01/16/2025 0947   Can you walk indoors, such as around your house? 1.75  filed at 01/16/2025 0947   Can you walk a block or two on level ground?  2.75  filed at 01/16/2025 0947   Can you climb a flight of stairs or walk up a hill? 5.5  filed at 01/16/2025 0947   Can you run a short distance? 0  filed at 01/16/2025 0947   Can you do light work around the house like dusting or washing dishes? 2.7  filed at 01/16/2025 0947   Can you do  moderate work around the house like vacuuming, sweeping floors or carrying groceries? 3.5  filed at 01/16/2025 0947   Can you do heavy work around the house like scrubbing floors or lifting and moving heavy furniture?  8  filed at 01/16/2025 0947   Can you do yard work like raking leaves, weeding or pushing a mower? 4.5  filed at 01/16/2025 0947   Can you participate in moderate recreational activities like golf, bowling, dancing, doubles tennis or throwing a baseball or football? 0  filed at 01/16/2025 0947   Can you participate in strenous sports like swimming, singles tennis, football, basketball, or skiing? 0  filed at 01/16/2025 0947          Caprini DVT Assessment    No data to display       Modified Frailty Index    No data to display       CHADS2 Stroke Risk  Current as of 6 hours ago        N/A 3 to 100%: High Risk   2 to < 3%: Medium Risk   0 to < 2%: Low Risk     Last Change: N/A          This score determines the patient's risk of having a stroke if the patient has atrial fibrillation.        This score is not applicable to this patient. Components are not calculated.          Revised Cardiac Risk Index    No data to display       Apfel Simplified Score    No data to display       Risk Analysis Index Results This Encounter    No data found in the last 10 encounters.       Stop Bang Score      Flowsheet Row Questionnaire Series Submission from 1/16/2025 in [facility] with Generic Provider Mo   Do you snore loudly? 0  filed at 01/16/2025 0947   Do you often feel tired or fatigued after your sleep? 1  filed at 01/16/2025 0947   Has anyone ever observed you stop breathing in your sleep? 0  filed at 01/16/2025 0947   Do you have or are you being treated for high blood pressure? 1  filed at 01/16/2025 0947   Recent BMI (Calculated) 29.9  filed at 01/16/2025 0947   Is BMI greater than 35 kg/m2? 0=No  filed at 01/16/2025 0947   Age older than 50 years old? 1=Yes  filed at 01/16/2025 0947   Gender - Male  0=No  filed at 01/16/2025 0947          Prodigy: High Risk  Total Score: 8              Prodigy Age Score           ARISCAT Score for Postoperative Pulmonary Complications    No data to display       Kristen Perioperative Risk for Myocardial Infarction or Cardiac Arrest (JAMAL)    No data to display         Nurse Plan of Action: RN screening call complete.  Reviewed allergies, medications and pharmacy, medical, surgical and social history with patient.  Chart updated.

## 2025-02-18 ENCOUNTER — APPOINTMENT (OUTPATIENT)
Dept: UROLOGY | Facility: CLINIC | Age: 65
End: 2025-02-18
Payer: MEDICARE

## 2025-02-18 VITALS — HEIGHT: 63 IN | TEMPERATURE: 96.8 F | BODY MASS INDEX: 29.77 KG/M2 | WEIGHT: 168 LBS

## 2025-02-18 DIAGNOSIS — N32.81 OAB (OVERACTIVE BLADDER): ICD-10-CM

## 2025-02-18 DIAGNOSIS — N39.41 URGE INCONTINENCE: Primary | ICD-10-CM

## 2025-02-18 PROCEDURE — 64566 NEUROELTRD STIM POST TIBIAL: CPT | Performed by: PHYSICIAN ASSISTANT

## 2025-02-18 ASSESSMENT — PAIN SCALES - GENERAL: PAINLEVEL_OUTOF10: 0-NO PAIN

## 2025-02-18 ASSESSMENT — ENCOUNTER SYMPTOMS: FREQUENCY: 1

## 2025-02-18 NOTE — PROGRESS NOTES
Subjective   Patient ID: Freda Douglas is a 64 y.o. female who presents for Urinary Incontinence (PTNS).  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 She reports urinary symptoms are improving. She has noticed her urinary sx worsen if rectal prolapse and bowel symptoms are bothering her.      Patient ID: Freda Douglas is a 64 y.o. female.    Percutaneous Tibial Nerve Stimulation    Date/Time: 2/18/2025 11:00 AM    Performed by: Josefina Gómez PA-C  Authorized by: Josefina Gómez PA-C    Procedure Details     Indications: urge incontinence, urinary frequency and urinary urgency      PTNS session #: 9    Bladder symptoms: improved      Ankle used: left      Stimulator intensity (mA): 4            Review of Systems   Genitourinary:  Positive for frequency and urgency.   All other systems reviewed and are negative.      Objective   Physical Exam  Constitutional:       General: She is not in acute distress.     Appearance: Normal appearance.   HENT:      Head: Normocephalic and atraumatic.      Nose: Nose normal.      Mouth/Throat:      Mouth: Mucous membranes are dry.   Cardiovascular:      Rate and Rhythm: Normal rate.   Pulmonary:      Effort: Pulmonary effort is normal.   Abdominal:      General: Abdomen is flat.      Palpations: Abdomen is soft.   Musculoskeletal:         General: Normal range of motion.      Cervical back: Normal range of motion and neck supple.   Skin:     General: Skin is warm and dry.   Neurological:      General: No focal deficit present.      Mental Status: She is alert and oriented to person, place, and time.   Psychiatric:         Mood and Affect: Mood normal.          Assessment/Plan     OAB  Patient tolerated procedure well.   She is noticing improved urinary symptoms with PTNS    Will continue weekly treatments.     Continue Oxybutynin.     Follow up next week for next PTNS.          Josefina Gómez PA-C 02/18/25 11:00 AM

## 2025-02-19 ENCOUNTER — PRE-ADMISSION TESTING (OUTPATIENT)
Dept: PREADMISSION TESTING | Facility: HOSPITAL | Age: 65
End: 2025-02-19
Payer: MEDICARE

## 2025-02-24 DIAGNOSIS — K62.3 RECTAL PROLAPSE: Primary | ICD-10-CM

## 2025-02-24 RX ORDER — METRONIDAZOLE 500 MG/100ML
500 INJECTION, SOLUTION INTRAVENOUS ONCE
Status: CANCELLED | OUTPATIENT
Start: 2025-02-24 | End: 2025-02-24

## 2025-02-24 RX ORDER — HEPARIN SODIUM 5000 [USP'U]/ML
5000 INJECTION, SOLUTION INTRAVENOUS; SUBCUTANEOUS ONCE
Status: CANCELLED | OUTPATIENT
Start: 2025-02-24 | End: 2025-02-24

## 2025-02-25 ENCOUNTER — APPOINTMENT (OUTPATIENT)
Dept: UROLOGY | Facility: CLINIC | Age: 65
End: 2025-02-25
Payer: MEDICARE

## 2025-02-25 VITALS — TEMPERATURE: 96.1 F

## 2025-02-25 DIAGNOSIS — N39.41 URGE INCONTINENCE: ICD-10-CM

## 2025-02-25 DIAGNOSIS — N32.81 OAB (OVERACTIVE BLADDER): Primary | ICD-10-CM

## 2025-02-25 PROCEDURE — 64566 NEUROELTRD STIM POST TIBIAL: CPT | Performed by: PHYSICIAN ASSISTANT

## 2025-02-25 ASSESSMENT — ENCOUNTER SYMPTOMS: FREQUENCY: 1

## 2025-02-25 NOTE — PROGRESS NOTES
Subjective   Patient ID: Freda Douglas is a 64 y.o. female who presents for PTNS .  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 She reports urinary symptoms are improving. With decreased urinary frequency and urgency.     Patient is scheduled for rectal prolapse repair next week and will not be able to finish all 12 treatments. Patient is interested in maintenance therapy      Patient ID: Freda Douglas is a 64 y.o. female.    Percutaneous Tibial Nerve Stimulation    Date/Time: 2/25/2025 12:50 PM    Performed by: Josefina Gómez PA-C  Authorized by: Josefina Gómez PA-C    Procedure Details     Indications: urge incontinence, urinary frequency and urinary urgency      PTNS session #: 10    Bladder symptoms: improved      Ankle used: left      Stimulator intensity (mA): 4            Review of Systems   Genitourinary:  Positive for frequency and urgency.   All other systems reviewed and are negative.      Objective   Physical Exam  Constitutional:       General: She is not in acute distress.     Appearance: Normal appearance.   HENT:      Head: Normocephalic and atraumatic.      Nose: Nose normal.      Mouth/Throat:      Mouth: Mucous membranes are dry.   Cardiovascular:      Rate and Rhythm: Normal rate.   Pulmonary:      Effort: Pulmonary effort is normal.   Abdominal:      General: Abdomen is flat.      Palpations: Abdomen is soft.   Musculoskeletal:         General: Normal range of motion.      Cervical back: Normal range of motion and neck supple.   Skin:     General: Skin is warm and dry.   Neurological:      General: No focal deficit present.      Mental Status: She is alert and  oriented to person, place, and time.   Psychiatric:         Mood and Affect: Mood normal.         Assessment/Plan     OAB  Patient tolerated procedure well.   She is noticing improved urinary symptoms with PTNS    Will start maintenance therapy after she recover from surgery.     Continue Oxybutynin.     Follow up for maintenance therapy at her earliest convenience          Josefina Gómez PA-C 02/25/25 12:50 PM

## 2025-02-26 ENCOUNTER — PRE-ADMISSION TESTING (OUTPATIENT)
Dept: PREADMISSION TESTING | Facility: HOSPITAL | Age: 65
End: 2025-02-26
Payer: MEDICARE

## 2025-02-27 ENCOUNTER — LAB (OUTPATIENT)
Dept: LAB | Facility: HOSPITAL | Age: 65
End: 2025-02-27
Payer: MEDICARE

## 2025-02-27 ENCOUNTER — PRE-ADMISSION TESTING (OUTPATIENT)
Dept: PREADMISSION TESTING | Facility: HOSPITAL | Age: 65
End: 2025-02-27
Payer: MEDICARE

## 2025-02-27 ENCOUNTER — DOCUMENTATION (OUTPATIENT)
Dept: PREADMISSION TESTING | Facility: HOSPITAL | Age: 65
End: 2025-02-27

## 2025-02-27 VITALS
HEART RATE: 67 BPM | DIASTOLIC BLOOD PRESSURE: 86 MMHG | TEMPERATURE: 96.3 F | WEIGHT: 165.34 LBS | BODY MASS INDEX: 29.3 KG/M2 | RESPIRATION RATE: 16 BRPM | SYSTOLIC BLOOD PRESSURE: 151 MMHG | OXYGEN SATURATION: 97 % | HEIGHT: 63 IN

## 2025-02-27 DIAGNOSIS — I10 ESSENTIAL (PRIMARY) HYPERTENSION: Primary | ICD-10-CM

## 2025-02-27 DIAGNOSIS — I10 PRIMARY HYPERTENSION: Primary | ICD-10-CM

## 2025-02-27 LAB
ABO GROUP (TYPE) IN BLOOD: NORMAL
ALBUMIN SERPL BCP-MCNC: 4.3 G/DL (ref 3.4–5)
ALP SERPL-CCNC: 107 U/L (ref 33–136)
ALT SERPL W P-5'-P-CCNC: 13 U/L (ref 7–45)
ANION GAP SERPL CALC-SCNC: 11 MMOL/L (ref 10–20)
ANTIBODY SCREEN: NORMAL
AST SERPL W P-5'-P-CCNC: 16 U/L (ref 9–39)
ATRIAL RATE: 67 BPM
BASOPHILS # BLD AUTO: 0.04 X10*3/UL (ref 0–0.1)
BASOPHILS NFR BLD AUTO: 0.6 %
BILIRUB SERPL-MCNC: 0.7 MG/DL (ref 0–1.2)
BUN SERPL-MCNC: 17 MG/DL (ref 6–23)
CALCIUM SERPL-MCNC: 9.4 MG/DL (ref 8.6–10.3)
CHLORIDE SERPL-SCNC: 101 MMOL/L (ref 98–107)
CO2 SERPL-SCNC: 31 MMOL/L (ref 21–32)
CREAT SERPL-MCNC: 0.69 MG/DL (ref 0.5–1.05)
EGFRCR SERPLBLD CKD-EPI 2021: >90 ML/MIN/1.73M*2
EOSINOPHIL # BLD AUTO: 0.06 X10*3/UL (ref 0–0.7)
EOSINOPHIL NFR BLD AUTO: 1 %
ERYTHROCYTE [DISTWIDTH] IN BLOOD BY AUTOMATED COUNT: 13.3 % (ref 11.5–14.5)
GLUCOSE SERPL-MCNC: 92 MG/DL (ref 74–99)
HCT VFR BLD AUTO: 38.3 % (ref 36–46)
HGB BLD-MCNC: 12.5 G/DL (ref 12–16)
IMM GRANULOCYTES # BLD AUTO: 0.02 X10*3/UL (ref 0–0.7)
IMM GRANULOCYTES NFR BLD AUTO: 0.3 % (ref 0–0.9)
LYMPHOCYTES # BLD AUTO: 1.41 X10*3/UL (ref 1.2–4.8)
LYMPHOCYTES NFR BLD AUTO: 22.7 %
MCH RBC QN AUTO: 27.4 PG (ref 26–34)
MCHC RBC AUTO-ENTMCNC: 32.6 G/DL (ref 32–36)
MCV RBC AUTO: 84 FL (ref 80–100)
MONOCYTES # BLD AUTO: 0.26 X10*3/UL (ref 0.1–1)
MONOCYTES NFR BLD AUTO: 4.2 %
NEUTROPHILS # BLD AUTO: 4.43 X10*3/UL (ref 1.2–7.7)
NEUTROPHILS NFR BLD AUTO: 71.2 %
NRBC BLD-RTO: 0 /100 WBCS (ref 0–0)
P AXIS: 33 DEGREES
P OFFSET: 187 MS
P ONSET: 145 MS
PLATELET # BLD AUTO: 356 X10*3/UL (ref 150–450)
POTASSIUM SERPL-SCNC: 3.3 MMOL/L (ref 3.5–5.3)
PR INTERVAL: 152 MS
PROT SERPL-MCNC: 6.9 G/DL (ref 6.4–8.2)
Q ONSET: 221 MS
QRS COUNT: 11 BEATS
QRS DURATION: 76 MS
QT INTERVAL: 390 MS
QTC CALCULATION(BAZETT): 412 MS
QTC FREDERICIA: 404 MS
R AXIS: 57 DEGREES
RBC # BLD AUTO: 4.57 X10*6/UL (ref 4–5.2)
RH FACTOR (ANTIGEN D): NORMAL
SODIUM SERPL-SCNC: 140 MMOL/L (ref 136–145)
T AXIS: 44 DEGREES
T OFFSET: 416 MS
VENTRICULAR RATE: 67 BPM
WBC # BLD AUTO: 6.2 X10*3/UL (ref 4.4–11.3)

## 2025-02-27 PROCEDURE — 87081 CULTURE SCREEN ONLY: CPT | Mod: AHULAB | Performed by: NURSE PRACTITIONER

## 2025-02-27 PROCEDURE — 99204 OFFICE O/P NEW MOD 45 MIN: CPT | Performed by: NURSE PRACTITIONER

## 2025-02-27 PROCEDURE — 86850 RBC ANTIBODY SCREEN: CPT

## 2025-02-27 PROCEDURE — 86901 BLOOD TYPING SEROLOGIC RH(D): CPT

## 2025-02-27 PROCEDURE — 93005 ELECTROCARDIOGRAM TRACING: CPT | Performed by: NURSE PRACTITIONER

## 2025-02-27 PROCEDURE — 36415 COLL VENOUS BLD VENIPUNCTURE: CPT

## 2025-02-27 PROCEDURE — 80053 COMPREHEN METABOLIC PANEL: CPT

## 2025-02-27 PROCEDURE — 85025 COMPLETE CBC W/AUTO DIFF WBC: CPT

## 2025-02-27 PROCEDURE — 86900 BLOOD TYPING SEROLOGIC ABO: CPT

## 2025-02-27 ASSESSMENT — ENCOUNTER SYMPTOMS
DIARRHEA: 1
ENDOCRINE NEGATIVE: 1
CARDIOVASCULAR NEGATIVE: 1
NECK NEGATIVE: 1
CONSTITUTIONAL NEGATIVE: 1
RESPIRATORY NEGATIVE: 1
NEUROLOGICAL NEGATIVE: 1
MUSCULOSKELETAL NEGATIVE: 1

## 2025-02-27 NOTE — CPM/PAT H&P
[facility]/PAT Evaluation       Name: Freda Douglas (Freda Douglas)  /Age: 1960/64 y.o.     In-Person         Date of Consult: 25    Referring Provider: Dr. Frias     Date, Surgery, and Length: 3/4/25, robotic proctectomy, end colostomy, 210 minutes    Patient presents to [facility] for perioperative risk assessment prior to scheduled surgery. Patient presents s/p sacrectomy for tumor and XRT with no anal tone and rectal prolapse and severe fecal incontinence for years with mixed urinary incontinence as well since her surgery.    First noticed rectal prolapse 2022, and now it is out all the time.  Mucous blood all the time.    She was suppose to have the prolapse repaired by Dr. Mondragon 2 years ago but wanted to hold off. She is now ready to proceed with surgery.    This note was created in part upon personal review of patient's medical records.      Pt denies any past history of anesthetic complications such as PONV, awareness, prolonged sedation, dental damage, aspiration, cardiac arrest, difficult intubation, difficult I.V. access or unexpected hospital admissions. No history of malignant hyperthermia and or pseudocholinesterase deficiency.    History of blood transfusion-  during sacrectomy     The patient IS NOT a Yarsani and will accept blood and blood products if medically indicated.     Type and screen WAS sent.      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     Obesity     Pelvic floor weakness     Rectal prolapse     Plan: Robotic Proctectomy; End Colostomy 3/4/25    Spinal headache     Urinary  incontinence, mixed     Vitamin D deficiency        Past Surgical History:   Procedure Laterality Date    ADENOIDECTOMY  1966    BLADDER SURGERY      COLONOSCOPY      SOFT TISSUE MASS EXCISION      Pelvic Excision Of Soft Tissue Tumor    SPINE SURGERY  1994    TONSILLECTOMY  1966    TUBAL LIGATION      WISDOM TOOTH EXTRACTION         Family History   Problem Relation Name Age of Onset    Multiple sclerosis Mother Thu Garner     Hypertension Mother Thu Garner     Stroke Mother Thu Garner     Cancer Father Lebron Garner     Hypertension Father Lebron Garner     Cancer Brother Lebron Garner     Hypertension Brother Lebron Garner     Heart disease Brother Rashaun Garner     Miscarriages / Stillbirths Maternal Grandmother Qing Hernandez        Allergies   Allergen Reactions    Sulfa (Sulfonamide Antibiotics) Hives     Social History     Tobacco Use   Smoking Status Former    Current packs/day: 0.00    Average packs/day: 0.5 packs/day for 4.0 years (2.0 ttl pk-yrs)    Types: Cigarettes    Start date:     Quit date:     Years since quittin.1   Smokeless Tobacco Never     Social History     Substance and Sexual Activity   Alcohol Use Yes    Alcohol/week: 8.0 standard drinks of alcohol    Types: 8 Glasses of wine per week     Social History     Substance and Sexual Activity   Drug Use Not Currently    Types: Marijuana       Current Outpatient Medications   Medication Instructions    atorvastatin (LIPITOR) 20 mg, oral, Daily    chlorhexidine (Peridex) 0.12 % solution Rinse mouth with 15 ml after toothbrushing the night before surgery and on the morning of surgery.  Expectorate after rinsing.  Do not swallow.    cloNIDine (CATAPRES) 0.1 mg, oral, 4 times daily    gabapentin (Neurontin) 100 mg capsule Take one capsule by mouth starting three days before surgery at bedtime.    metroNIDAZOLE (Flagyl) 250 mg tablet Take one tablet at 6p, 7p, and 11p the night before surgery.    neomycin (Mycifradin) 500 mg tablet Take two  "tabs by mouth at 6p, 7pm, and 11p the night before surgery.    omeprazole (PriLOSEC) 20 mg DR capsule TAKE 1 CAPSULE DAILY. DO   NOT CRUSH OR CHEW    oxybutynin (DITROPAN) 5 mg, oral, 2 times daily    triamterene-hydrochlorothiazid (Dyazide) 37.5-25 mg capsule 1 capsule, oral, Every morning    triamterene-hydrochlorothiazid (Dyazide) 37.5-25 mg capsule 1 capsule, oral, Every morning    verapamil SR (CALAN-SR) 240 mg, oral, Daily        PAT ROS:   Constitutional:   neg    Neuro/Psych:   neg    Eyes:    Had lasix surgery   use of corrective lenses  Ears:   neg    Nose:   neg    Mouth:   neg    Throat:   neg    Neck:   neg    Cardio:   neg    Respiratory:   neg    Endocrine:   neg    GI:    diarrhea  :    Rectal prolapse   vaginal issues  Musculoskeletal:   neg    Hematologic:   neg    Skin:  neg        Physical Exam  Vitals reviewed.          PAT AIRWAY:   Airway:     Mallampati::  II    Neck ROM::  Full  normal        Visit Vitals  /86   Pulse 67   Temp 35.7 °C (96.3 °F)   Resp 16   Ht 1.6 m (5' 2.99\")   Wt 75 kg (165 lb 5.5 oz)   SpO2 97%   BMI 29.30 kg/m²   Smoking Status Former   BSA 1.83 m²     Assessment and Plan:     Patient is a 64 year old male scheduled for robotic proctectomy, end colostomy with Dr. Frias on 3/4/25.    Patient is at acceptable risk to proceed with planned surgical procedure. Further cardiac risk stratification deferred at this time.This patient is INTERMEDIATE risk candidate undergoing MODERATE risk procedure, patient is medically optimized for surgery.    Plan    Neuro:  No neurologic diagnosis, however, the patient is at increased risk for perioperative delirium secondary to  age, polypharmacy, type and duration of surgery, Patient instructed on and provided cognitive exercises  Patient is at increased risk for perioperative CVA secondary to  HTN, increased age, operative time > 2.5 hours      Cardiovascular:    RCRI: 1 Risk of Mace: 6%      Patient denies any chest pain, " tightness, heaviness, pressure, radiating pain, palpitations, irregular heartbeats, lightheadedness, cough, congestion, shortness of breath, CONNOR, PND, near syncope, weight loss or gain.    Fair functional capacity      EKG in PAT   Encounter Date: 02/27/25   ECG 12 Lead   Result Value    Ventricular Rate 67    Atrial Rate 67    DC Interval 152    QRS Duration 76    QT Interval 390    QTC Calculation(Bazett) 412    P Axis 33    R Axis 57    T Axis 44    QRS Count 11    Q Onset 221    P Onset 145    P Offset 187    T Offset 416    QTC Fredericia 404    Narrative    Normal sinus rhythm  Nonspecific T wave abnormality  Abnormal ECG  When compared with ECG of 04-FEB-2008 21:26,  Vent. rate has decreased BY  35 BPM  Non-specific change in ST segment in Inferior leads  Non-specific change in ST segment in Anterolateral leads  T wave inversion no longer evident in Inferior leads  T wave inversion no longer evident in Anterior leads  Confirmed by Steven Espino ([id number]) on 2/27/2025 11:01:55 AM     HTN- will hold Verapamil 24 hours prior to surgery, will continue Dyazide  HLD- continue statin      Pulmonary:  No pulmonary diagnosis, however patient is at increased risk of perioperative complications secondary to  age > 60, obesity, site of surgery, major surgery, duration of surgery > 2 hours, types of anesthetic  Stop Bang score is 3 placing patient at low risk for BLAS  ARISCAT: 26-44 points, 13.3% risk of in-hospital postoperative pulmonary complication  PRODIGY: Moderate risk for opioid induced respiratory depression  Pumonary toilet education discussed, patient also provided deep breathing exercises and incentive spirometry educational handout    Renal/endo:  Recommendations to avoid nephrotoxic drugs and carefully monitor fluid status to maintain euvolemia. Use dose adjusted medications as needed for the underlying level of renal function.    Heme:  Patient instructed to ambulate as soon as possible postoperatively to  decrease thromboembolic risk.    Initiate mechanical DVT prophylaxis as soon as possible and initiate chemical prophylaxis when deemed safe from a bleeding standpoint post surgery.      Caprini=4      Risk assessment complete.  Patient is scheduled for a INTERMEDIATE surgical risk procedure.     She IS considered medically optimized for the planned procedure.      Labs/testing obtained in PAT on 2/27/25: CBC, CMP, T&S, MRSA, EKG    Lab Results   Component Value Date    WBC 6.2 02/27/2025    HGB 12.5 02/27/2025    HCT 38.3 02/27/2025    MCV 84 02/27/2025     02/27/2025     Lab Results   Component Value Date    GLUCOSE 92 02/27/2025    CALCIUM 9.4 02/27/2025     02/27/2025    K 3.3 (L) 02/27/2025    CO2 31 02/27/2025     02/27/2025    BUN 17 02/27/2025    CREATININE 0.69 02/27/2025         Component 3 d ago   ABO TYPE B   Rh TYPE POS   ANTIBODY SCREEN NEG   Resulting Agency [facility]             Specimen Collected: 02/27/25 10:24         Follow up/communication: none      Preoperative medication instructions were provided and reviewed with the patient.  Any additional testing or evaluation was explained to the patient.  Nothing by mouth instructions were discussed and patient's questions were answered prior to conclusion to this encounter.  Patient verbalized understanding of preoperative instructions given in preadmission testing; discharge instructions available in EMR.    This note was dictated with speech recognition.  Minor errors may have been detected during use of speech recognition.

## 2025-02-27 NOTE — CPM/PAT NURSE NOTE
CPM/PAT Nurse Note      Name: Freda Douglas (Freda Douglas)  /Age: 1960/64 y.o.       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    Family History   Problem Relation Name Age of Onset    Multiple sclerosis Mother Thu Garner     Hypertension Mother Thu Garner     Stroke Mother Thu Garner     Cancer Father Lebron Garner     Hypertension Father Lebron Garner     Cancer Brother Lebron Garner     Hypertension Brother Lebron Garner     Heart disease Brother Rashaun Garner     Miscarriages / Stillbirths Maternal Grandmother Qing Hernandez        Allergies   Allergen Reactions    Sulfa (Sulfonamide Antibiotics) Hives       Prior to Admission medications    Medication Sig Start Date End Date Taking? Authorizing Provider   atorvastatin (Lipitor) 20 mg tablet Take 1 tablet (20 mg) by mouth once daily. 24  Ishan Chandra MD   chlorhexidine (Peridex) 0.12 % solution Rinse mouth with 15 ml after toothbrushing the night before surgery and on the morning of surgery.  Expectorate after rinsing.  Do not swallow.  1/9/25   Rosie Frias MD   cloNIDine (Catapres) 0.1 mg tablet Take 1 tablet (0.1 mg) by mouth 4 times a day. 1/31/25 1/31/26  Ishan Chandra MD   gabapentin (Neurontin) 100 mg capsule Take one capsule by mouth starting three days before surgery at bedtime.  Patient not taking: Reported on 2/27/2025 1/9/25   Rosie Frais MD   metroNIDAZOLE (Flagyl) 250 mg tablet Take one tablet at 6p, 7p, and 11p the night before surgery.  Patient not taking: Reported on 2/27/2025 1/9/25   Rosie Frias MD   neomycin (Mycifradin) 500 mg tablet Take two tabs by mouth at 6p, 7pm, and 11p the night before surgery.  Patient not taking: Reported on 2/27/2025 1/9/25   Rosie Frias MD   omeprazole (PriLOSEC) 20 mg DR capsule TAKE 1 CAPSULE DAILY. DO   NOT CRUSH OR CHEW 9/30/24   Ishan Chandra MD   oxybutynin (Ditropan) 5 mg tablet TAKE 1 TABLET TWICE A DAY 9/4/24   Ishan Chandra MD   triamterene-hydrochlorothiazid (Dyazide) 37.5-25 mg capsule TAKE 1 CAPSULE BY MOUTH ONCE DAILY IN THE MORNING. 12/4/24 12/4/25  Ishan Chandra MD   triamterene-hydrochlorothiazid (Dyazide) 37.5-25 mg capsule Take 1 capsule by mouth once daily in the morning. 12/4/24 12/4/25  Ishan Chandra MD   verapamil SR (Calan-SR) 240 mg ER tablet Take 1 tablet (240 mg) by mouth once daily. 2/14/24 2/27/25  Ishan Chandra MD        PAT ROS     DASI Risk Score      Flowsheet Row Questionnaire Series Submission from 1/16/2025 in [facility] Care with Generic Provider Mo   Can you take care of yourself (eat, dress, bathe, or use toilet)?  2.75  filed at 01/16/2025 0947   Can you walk indoors, such as around your house? 1.75  filed at 01/16/2025 0947   Can you walk a block or two on level ground?  2.75  filed at 01/16/2025 0947   Can you climb a flight of stairs or walk up a hill? 5.5  filed at 01/16/2025 0947   Can you run a short distance? 0  filed at 01/16/2025 0947   Can you do light work around the house like dusting or washing dishes? 2.7  filed at 01/16/2025 0974    Can you do moderate work around the house like vacuuming, sweeping floors or carrying groceries? 3.5  filed at 01/16/2025 0947   Can you do heavy work around the house like scrubbing floors or lifting and moving heavy furniture?  8  filed at 01/16/2025 0947   Can you do yard work like raking leaves, weeding or pushing a mower? 4.5  filed at 01/16/2025 0947   Can you participate in moderate recreational activities like golf, bowling, dancing, doubles tennis or throwing a baseball or football? 0  filed at 01/16/2025 0947   Can you participate in strenous sports like swimming, singles tennis, football, basketball, or skiing? 0  filed at 01/16/2025 0947          Caprini DVT Assessment    No data to display       Modified Frailty Index    No data to display       CHADS2 Stroke Risk  Current as of about an hour ago        N/A 3 to 100%: High Risk   2 to < 3%: Medium Risk   0 to < 2%: Low Risk     Last Change: N/A          This score determines the patient's risk of having a stroke if the patient has atrial fibrillation.        This score is not applicable to this patient. Components are not calculated.          Revised Cardiac Risk Index    No data to display       Apfel Simplified Score    No data to display       Risk Analysis Index Results This Encounter    No data found in the last 10 encounters.       Stop Bang Score      Flowsheet Row Questionnaire Series Submission from 1/16/2025 in [facility] Care with Generic Provider Mo   Do you snore loudly? 0  filed at 01/16/2025 0947   Do you often feel tired or fatigued after your sleep? 1  filed at 01/16/2025 0947   Has anyone ever observed you stop breathing in your sleep? 0  filed at 01/16/2025 0947   Do you have or are you being treated for high blood pressure? 1  filed at 01/16/2025 0947   Recent BMI (Calculated) 29.9  filed at 01/16/2025 0947   Is BMI greater than 35 kg/m2? 0=No  filed at 01/16/2025 0947   Age older than 50 years old? 1=Yes  filed at 01/16/2025  0947   Gender - Male 0=No  filed at 01/16/2025 0947          Prodigy: High Risk  Total Score: 8              Prodigy Age Score           ARISCAT Score for Postoperative Pulmonary Complications    No data to display       Peterson Perioperative Risk for Myocardial Infarction or Cardiac Arrest (JAMAL)    No data to display         Nurse Plan of Action:     After Visit Summary (AVS) reviewed and patient verbalized good understanding of medications and NPO instructions.  Pre-op infection prevention measures:  CHG showers and mouthwash reviewed, understanding voiced.  CHG soap given and patient verbalized need to pick CHG mouthwash at their preferred local pharmacy.

## 2025-02-27 NOTE — PREPROCEDURE INSTRUCTIONS
Medication List            Accurate as of February 27, 2025  9:29 AM. Always use your most recent med list.                atorvastatin 20 mg tablet  Commonly known as: Lipitor  Take 1 tablet (20 mg) by mouth once daily.  Medication Adjustments for Surgery: Take/Use as prescribed     chlorhexidine 0.12 % solution  Commonly known as: Peridex  Rinse mouth with 15 ml after toothbrushing the night before surgery and on the morning of surgery.  Expectorate after rinsing.  Do not swallow.  Medication Adjustments for Surgery: Take/Use as prescribed     cloNIDine 0.1 mg tablet  Commonly known as: Catapres  Take 1 tablet (0.1 mg) by mouth 4 times a day.  Medication Adjustments for Surgery: Take/Use as prescribed     gabapentin 100 mg capsule  Commonly known as: Neurontin  Take one capsule by mouth starting three days before surgery at bedtime.  Medication Adjustments for Surgery: Take/Use as prescribed     metroNIDAZOLE 250 mg tablet  Commonly known as: Flagyl  Take one tablet at 6p, 7p, and 11p the night before surgery.  Medication Adjustments for Surgery: Take/Use as prescribed     neomycin 500 mg tablet  Commonly known as: Mycifradin  Take two tabs by mouth at 6p, 7pm, and 11p the night before surgery.  Medication Adjustments for Surgery: Take/Use as prescribed     omeprazole 20 mg DR capsule  Commonly known as: PriLOSEC  TAKE 1 CAPSULE DAILY. DO   NOT CRUSH OR CHEW  Medication Adjustments for Surgery: Take/Use as prescribed     oxybutynin 5 mg tablet  Commonly known as: Ditropan  TAKE 1 TABLET TWICE A DAY  Medication Adjustments for Surgery: Do Not take on the morning of surgery     * triamterene-hydrochlorothiazid 37.5-25 mg capsule  Commonly known as: Dyazide  TAKE 1 CAPSULE BY MOUTH ONCE DAILY IN THE MORNING.  Medication Adjustments for Surgery: Take/Use as prescribed     * triamterene-hydrochlorothiazid 37.5-25 mg capsule  Commonly known as: Dyazide  Take 1 capsule by mouth once daily in the morning.  Medication  Adjustments for Surgery: Take/Use as prescribed     verapamil  mg ER tablet  Commonly known as: Calan-SR  Take 1 tablet (240 mg) by mouth once daily.  Medication Adjustments for Surgery: Take last dose 1 day (24 hours) before surgery  Notes to patient: Do NOT take evening before surgery and do NOT take morning of surgery.           * This list has 2 medication(s) that are the same as other medications prescribed for you. Read the directions carefully, and ask your doctor or other care provider to review them with you.                    Preoperative Brain Exercises    What are brain exercises?  A brain exercise is any activity that engages your thinking (cognitive) skills.    What types of activities are considered brain exercises?  Jigsaw puzzles, crossword puzzles, word jumble, memory games, word search, and many more.  Many can be found free online or on your phone via a mobile taye.    Why should I do brain exercises before my surgery?  More recent research has shown brain exercise before surgery can lower the risk of postoperative delirium (confusion) which can be especially important for older adults.  Patients who did brain exercises for 5 to 10 hours the days before surgery, cut their risk of postoperative delirium in half up to 1 week after surgery.        Preoperative Deep Breathing Exercises  Why it is important to do deep breathing exercises before my surgery?  Deep breathing exercises strengthen your breathing muscles.  This helps you to recover after your surgery and decreases the chance of breathing complications.  How are the deep breathing exercises done?  Sit straight with your back supported.  Breathe in deeply and slowly through your nose. Your lower rib cage should expand and your abdomen may move forward.  Hold that breath for 3 to 5 seconds.  Breathe out through pursed lips, slowly and completely.  Rest and repeat 10 times every hour while awake.  Rest longer if you become dizzy or  lightheaded.        CONTACT SURGEON'S OFFICE IF YOU DEVELOP:  * Fever = 100.4 F   * New respiratory symptoms (e.g. cough, shortness of breath, respiratory distress, sore throat)  * Recent loss of taste or smell  *Flu like symptoms such as headache, fatigue or gastrointestinal symptoms  * You develop any open sores, shingles, burning or painful urination   AND/OR:  * You no longer wish to have the surgery.  * Any other personal circumstances change that may lead to the need to cancel or defer this surgery.  *You were admitted to any hospital within one week of your planned procedure.    SMOKING:  *Quitting smoking can make a huge difference to your health and recovery from surgery.    *If you need help with quitting, call 5-048-QUIT-NOW.    THE DAY OF SURGERY:  *Do not eat any food after midnight the night before your surgery.   *YOU MUST drink 14 OUNCES of clear liquids TWO hours before your instructed ARRIVAL TIME to the hospital. This includes water, black tea/coffee (no milk or cream), apple juice, clear broth and electrolyte drinks (Gatorade).  Please avoid clear liquids that are red in color.   *You may chew gum/mints up to TWO hours before your surgery/procedure.    SURGICAL TIME:  *You will be contacted between 2 p.m. and 6 p.m. the business day before your surgery with your arrival time.  *If you haven't received a call by 6pm, call [phone].  *Scheduled surgery times may change and you will be notified if this occurs-check your personal voicemail for any updates.    ON THE MORNING OF SURGERY:  *Wear comfortable, loose fitting clothing.   *Do not use moisturizers, creams, lotions or perfume.  *All jewelry and valuables should be left at home.  *Prosthetic devices such as contact lenses, hearing aids, dentures, eyelash extensions, hairpins and body piercing must be removed before surgery.    BRING WITH YOU:  *Photo ID and insurance card  *Current list of medications and  allergies  *Pacemaker/Defibrillator/Heart stent cards  *CPAP machine and mask  *Slings/splints/crutches  *Copy of your complete Advanced Directive/DHPOA-if applicable  *Neurostimulator implant remote    PARKING AND ARRIVAL:  *Check in at the Main Entrance desk and let them know you are here for surgery.  *You will be directed to the 2nd floor surgical waiting area.    IF YOU ARE HAVING OUTPATIENT/SAME DAY SURGERY:  *A responsible adult MUST accompany you at the time of discharge and stay with you for 24 hours after your surgery.  *You may NOT drive yourself home after surgery.  *You may use a taxi or ride sharing service ([employer], Uber) to return home ONLY if you are accompanied by a friend or family member.  *Instructions for resuming your medications will be provided by your surgeon.        Patient Information: Oral/Dental Rinse  **This is a prescription; pick it up at your preferred local pharmacy **  What is oral/dental rinse?   It is a mouthwash. It is a way of cleaning the mouth with a germ killing solution before your surgery.  The solution contains chlorhexidine, commonly known as CHG.   It is used inside the mouth to kill a bacteria known as Staphylococcus aureus.  Let your doctor know if you are allergic to Chlorhexidine.    Why do I need to use CHG oral/dental rinse?  The CHG oral/dental rinse helps to kill a bacteria in your mouth known a Staphylococcus aureus.     This reduces the risk of infection at the surgical site.      Using your CHG oral/dental rinse  STEPS:  Use your CHG oral/dental rinse after you brush your teeth the night before (at bedtime) and the morning of your surgery.  Follow all directions on your prescription label.    Use the cap on the container to measure 15ml (fill cap to fill line)  Swish (gargle if you can) the mouthwash in your mouth for at least 30 seconds, (do not to swallow) spit out  After you use your CHG rinse, do not rinse your mouth with water, drink or eat.  Please refer  to prescription label for the appropriate time to resume oral intake  Dental rinse comes in one size bottle: 473ml ~16oz.  You will have leftover    rinse, discard after this use.    What side effects might I have using the CHG oral/dental rinse?  CHG rinse will stick to plaque on the teeth.  Brush and floss just before use.  Teeth brushing will help avoid staining of plaque during use.    Who should I contact if I have questions about the CHG oral/dental rinse?  Please call [facility], Preadmission Testing at [phone] if you have any questions.      Home Preoperative Antibacterial Shower     What is a home preoperative antibacterial shower?  This shower is a way of cleaning the skin with a germ killing soap before surgery.  The soap contains chlorhexidine, commonly known as CHG.  CHG is a soap for your skin with germ killing ability.  Let your doctor know if you are allergic to chlorhexidine.    Why do I need to take a preoperative antibacterial shower?  Skin is not sterile.  It is best to try to make your skin as free of germs as possible before surgery.  Proper cleansing with a germ killing soap before surgery can lower the number of germs on your skin.  This helps to reduce the risk of infection at the surgical site.  Following the instructions listed below will help you prepare your skin for surgery.      How do I use the CHG skin cleanser?  Steps:  Begin using your CHG soap five days before your scheduled surgery on ________________________.    Days 1-4 Shower before bed:  Wash your face and genitals with your normal soap and rinse.           2.    Apply the CHG soap to a clean wet washcloth.  Turn the water off or move away                From the water spray to avoid premature rinsing of the CHG soap as you are applying.     3.   Lather your entire body from the neck down.  Do not use on your face or genitals.  4. Pay special attention to the area(s) where your incision(s)  will be located unless they are on your face.  Avoid scrubbing your skin too hard.  The important point is to have the CHG soap sit on your skin for 3 minutes.    When the 3 minutes are up, turn on the water and rinse the CHG soap off your body completely.   Pat yourself dry with a clean, freshly-laundered towel.  Dress in clean, freshly laundered night clothes.    Be sure to sleep with clean, freshly laundered sheets.  Day 5:  Last shower is the morning of surgery: Follow above Instructions.    NOTE:        *Keep CHG soap out of eyes and ear canals   *DO NOT wash with regular soap on your body after you have used the CHG soap solution  *DO NOT apply powders, lotions, or perfume.  *Deodorant may be used days 1-4, BUT NOT the day of surgery    Who should I contact if I have any questions regarding the use of CHG soap?  Call the [facility], Preadmission Testing at [phone] if you have any questions.              Patient Information: Pre-Operative Infection Prevention Measures     Why did I have my nose, under my arms and groin swabbed?  The purpose of the swab is to identify Staphylococcus aureus inside your nose or on your skin.  The swab was sent to the laboratory for culture.  A positive swab/culture for Staphylococcus aureus is called colonization or carriage.      What is Staphylococcus aureus?  Staphylococcus aureus, also known as “staph”, is a germ found on the skin or in the nose of healthy people.  Sometimes Staphylococcus aureus can get into the body and cause an infection.  This can be minor (such as pimples, boils or other skin problems).  It might also be serious (such as blood infection, pneumonia or a surgical site infection).    What is Staphylococcus aureus colonization or carriage?  Colonization or carriage means that a person has the germ but is not sick from it.  These bacteria can be spread on the hands or when breathing or sneezing.    How is Staphylococcus  aureus spread?  It is most often spread by close contact with a person or item that carries it.    What happens if my culture is positive for Staphylococcus aureus?  Your doctor/medical team will use this information to guide any antibiotic treatment which may be necessary.  Regardless of the culture results, we will clean the inside of your nose with a betadine swab just before you have your surgery.      Will I get an infection if I have Staphylococcus aureus in my nose or on my skin?  Anyone can get an infection with Staphylococcus aureus.  However, the best way to reduce your risk of infection is to follow the instructions provided to you for the use of your CHG soap and dental rinse.        Who should I contact if I have any questions?  Call the [facility], Preadmission Testing at [phone] if you have any questions.

## 2025-03-01 LAB — STAPHYLOCOCCUS SPEC CULT: NORMAL

## 2025-03-04 ENCOUNTER — HOSPITAL ENCOUNTER (INPATIENT)
Facility: HOSPITAL | Age: 65
LOS: 5 days | Discharge: HOME | End: 2025-03-09
Attending: COLON & RECTAL SURGERY | Admitting: COLON & RECTAL SURGERY
Payer: MEDICARE

## 2025-03-04 ENCOUNTER — ANESTHESIA EVENT (OUTPATIENT)
Dept: OPERATING ROOM | Facility: HOSPITAL | Age: 65
End: 2025-03-04
Payer: MEDICARE

## 2025-03-04 ENCOUNTER — ANESTHESIA (OUTPATIENT)
Dept: OPERATING ROOM | Facility: HOSPITAL | Age: 65
End: 2025-03-04
Payer: MEDICARE

## 2025-03-04 ENCOUNTER — APPOINTMENT (OUTPATIENT)
Dept: UROLOGY | Facility: CLINIC | Age: 65
End: 2025-03-04
Payer: MEDICARE

## 2025-03-04 DIAGNOSIS — K62.3 RECTAL PROLAPSE: Primary | ICD-10-CM

## 2025-03-04 LAB
ABO GROUP (TYPE) IN BLOOD: NORMAL
ANTIBODY SCREEN: NORMAL
RH FACTOR (ANTIGEN D): NORMAL

## 2025-03-04 PROCEDURE — 45395 LAP REMOVAL OF RECTUM: CPT | Performed by: COLON & RECTAL SURGERY

## 2025-03-04 PROCEDURE — 2500000004 HC RX 250 GENERAL PHARMACY W/ HCPCS (ALT 636 FOR OP/ED)

## 2025-03-04 PROCEDURE — 2500000004 HC RX 250 GENERAL PHARMACY W/ HCPCS (ALT 636 FOR OP/ED): Performed by: ANESTHESIOLOGIST ASSISTANT

## 2025-03-04 PROCEDURE — 0DTP4ZZ RESECTION OF RECTUM, PERCUTANEOUS ENDOSCOPIC APPROACH: ICD-10-PCS | Performed by: COLON & RECTAL SURGERY

## 2025-03-04 PROCEDURE — 7100000001 HC RECOVERY ROOM TIME - INITIAL BASE CHARGE: Performed by: COLON & RECTAL SURGERY

## 2025-03-04 PROCEDURE — 88307 TISSUE EXAM BY PATHOLOGIST: CPT | Mod: TC,AHULAB | Performed by: COLON & RECTAL SURGERY

## 2025-03-04 PROCEDURE — 2500000004 HC RX 250 GENERAL PHARMACY W/ HCPCS (ALT 636 FOR OP/ED): Performed by: STUDENT IN AN ORGANIZED HEALTH CARE EDUCATION/TRAINING PROGRAM

## 2025-03-04 PROCEDURE — 2500000001 HC RX 250 WO HCPCS SELF ADMINISTERED DRUGS (ALT 637 FOR MEDICARE OP): Performed by: STUDENT IN AN ORGANIZED HEALTH CARE EDUCATION/TRAINING PROGRAM

## 2025-03-04 PROCEDURE — 36415 COLL VENOUS BLD VENIPUNCTURE: CPT | Performed by: COLON & RECTAL SURGERY

## 2025-03-04 PROCEDURE — A45395 PR LAP, SURG PROCTECTOMY W COLOSTOMY: Performed by: ANESTHESIOLOGY

## 2025-03-04 PROCEDURE — 2720000007 HC OR 272 NO HCPCS: Performed by: COLON & RECTAL SURGERY

## 2025-03-04 PROCEDURE — 3700000002 HC GENERAL ANESTHESIA TIME - EACH INCREMENTAL 1 MINUTE: Performed by: COLON & RECTAL SURGERY

## 2025-03-04 PROCEDURE — 3700000001 HC GENERAL ANESTHESIA TIME - INITIAL BASE CHARGE: Performed by: COLON & RECTAL SURGERY

## 2025-03-04 PROCEDURE — 1100000001 HC PRIVATE ROOM DAILY

## 2025-03-04 PROCEDURE — 86850 RBC ANTIBODY SCREEN: CPT | Performed by: COLON & RECTAL SURGERY

## 2025-03-04 PROCEDURE — 2500000005 HC RX 250 GENERAL PHARMACY W/O HCPCS: Performed by: COLON & RECTAL SURGERY

## 2025-03-04 PROCEDURE — 0D1N4Z4 BYPASS SIGMOID COLON TO CUTANEOUS, PERCUTANEOUS ENDOSCOPIC APPROACH: ICD-10-PCS | Performed by: COLON & RECTAL SURGERY

## 2025-03-04 PROCEDURE — 2500000002 HC RX 250 W HCPCS SELF ADMINISTERED DRUGS (ALT 637 FOR MEDICARE OP, ALT 636 FOR OP/ED): Performed by: STUDENT IN AN ORGANIZED HEALTH CARE EDUCATION/TRAINING PROGRAM

## 2025-03-04 PROCEDURE — 86901 BLOOD TYPING SEROLOGIC RH(D): CPT | Performed by: COLON & RECTAL SURGERY

## 2025-03-04 PROCEDURE — 2500000004 HC RX 250 GENERAL PHARMACY W/ HCPCS (ALT 636 FOR OP/ED): Performed by: COLON & RECTAL SURGERY

## 2025-03-04 PROCEDURE — 3600000018 HC OR TIME - INITIAL BASE CHARGE - PROCEDURE LEVEL SIX: Performed by: COLON & RECTAL SURGERY

## 2025-03-04 PROCEDURE — 99231 SBSQ HOSP IP/OBS SF/LOW 25: CPT

## 2025-03-04 PROCEDURE — 7100000002 HC RECOVERY ROOM TIME - EACH INCREMENTAL 1 MINUTE: Performed by: COLON & RECTAL SURGERY

## 2025-03-04 PROCEDURE — A45395 PR LAP, SURG PROCTECTOMY W COLOSTOMY

## 2025-03-04 PROCEDURE — 51702 INSERT TEMP BLADDER CATH: CPT

## 2025-03-04 PROCEDURE — 3600000017 HC OR TIME - EACH INCREMENTAL 1 MINUTE - PROCEDURE LEVEL SIX: Performed by: COLON & RECTAL SURGERY

## 2025-03-04 PROCEDURE — 2500000004 HC RX 250 GENERAL PHARMACY W/ HCPCS (ALT 636 FOR OP/ED): Performed by: ANESTHESIOLOGY

## 2025-03-04 RX ORDER — OXYCODONE HYDROCHLORIDE 5 MG/1
10 TABLET ORAL EVERY 4 HOURS PRN
Status: DISCONTINUED | OUTPATIENT
Start: 2025-03-04 | End: 2025-03-08

## 2025-03-04 RX ORDER — HYDROMORPHONE HYDROCHLORIDE 1 MG/ML
INJECTION, SOLUTION INTRAMUSCULAR; INTRAVENOUS; SUBCUTANEOUS AS NEEDED
Status: DISCONTINUED | OUTPATIENT
Start: 2025-03-04 | End: 2025-03-04

## 2025-03-04 RX ORDER — DIPHENHYDRAMINE HYDROCHLORIDE 50 MG/ML
25 INJECTION INTRAMUSCULAR; INTRAVENOUS ONCE
Status: COMPLETED | OUTPATIENT
Start: 2025-03-04 | End: 2025-03-04

## 2025-03-04 RX ORDER — LIDOCAINE HYDROCHLORIDE 10 MG/ML
0.1 INJECTION, SOLUTION EPIDURAL; INFILTRATION; INTRACAUDAL; PERINEURAL ONCE
Status: DISCONTINUED | OUTPATIENT
Start: 2025-03-04 | End: 2025-03-04 | Stop reason: HOSPADM

## 2025-03-04 RX ORDER — LIDOCAINE HYDROCHLORIDE AND EPINEPHRINE 10; 10 UG/ML; MG/ML
INJECTION, SOLUTION INFILTRATION; PERINEURAL AS NEEDED
Status: DISCONTINUED | OUTPATIENT
Start: 2025-03-04 | End: 2025-03-04 | Stop reason: HOSPADM

## 2025-03-04 RX ORDER — HYDRALAZINE HYDROCHLORIDE 20 MG/ML
INJECTION INTRAMUSCULAR; INTRAVENOUS AS NEEDED
Status: DISCONTINUED | OUTPATIENT
Start: 2025-03-04 | End: 2025-03-04

## 2025-03-04 RX ORDER — ROCURONIUM BROMIDE 10 MG/ML
INJECTION, SOLUTION INTRAVENOUS AS NEEDED
Status: DISCONTINUED | OUTPATIENT
Start: 2025-03-04 | End: 2025-03-04

## 2025-03-04 RX ORDER — METOCLOPRAMIDE HYDROCHLORIDE 5 MG/ML
10 INJECTION INTRAMUSCULAR; INTRAVENOUS ONCE AS NEEDED
Status: DISCONTINUED | OUTPATIENT
Start: 2025-03-04 | End: 2025-03-04 | Stop reason: HOSPADM

## 2025-03-04 RX ORDER — HEPARIN SODIUM 5000 [USP'U]/ML
5000 INJECTION, SOLUTION INTRAVENOUS; SUBCUTANEOUS ONCE
Status: COMPLETED | OUTPATIENT
Start: 2025-03-04 | End: 2025-03-04

## 2025-03-04 RX ORDER — SODIUM CHLORIDE 0.9 G/100ML
INJECTION, SOLUTION IRRIGATION AS NEEDED
Status: DISCONTINUED | OUTPATIENT
Start: 2025-03-04 | End: 2025-03-04 | Stop reason: HOSPADM

## 2025-03-04 RX ORDER — FENTANYL CITRATE 50 UG/ML
INJECTION, SOLUTION INTRAMUSCULAR; INTRAVENOUS AS NEEDED
Status: DISCONTINUED | OUTPATIENT
Start: 2025-03-04 | End: 2025-03-04

## 2025-03-04 RX ORDER — ONDANSETRON HYDROCHLORIDE 2 MG/ML
4 INJECTION, SOLUTION INTRAVENOUS EVERY 8 HOURS PRN
Status: DISCONTINUED | OUTPATIENT
Start: 2025-03-04 | End: 2025-03-09 | Stop reason: HOSPADM

## 2025-03-04 RX ORDER — SODIUM CHLORIDE 9 MG/ML
40 INJECTION, SOLUTION INTRAVENOUS CONTINUOUS
Status: ACTIVE | OUTPATIENT
Start: 2025-03-04 | End: 2025-03-05

## 2025-03-04 RX ORDER — TRIAMTERENE/HYDROCHLOROTHIAZID 37.5-25 MG
1 TABLET ORAL DAILY
Status: DISCONTINUED | OUTPATIENT
Start: 2025-03-05 | End: 2025-03-09 | Stop reason: HOSPADM

## 2025-03-04 RX ORDER — OXYCODONE HYDROCHLORIDE 5 MG/1
5 TABLET ORAL EVERY 4 HOURS PRN
Status: DISCONTINUED | OUTPATIENT
Start: 2025-03-04 | End: 2025-03-04 | Stop reason: HOSPADM

## 2025-03-04 RX ORDER — CLONIDINE HYDROCHLORIDE 0.1 MG/1
0.1 TABLET ORAL 4 TIMES DAILY
Status: DISCONTINUED | OUTPATIENT
Start: 2025-03-05 | End: 2025-03-09 | Stop reason: HOSPADM

## 2025-03-04 RX ORDER — CEFAZOLIN SODIUM 1 G/50ML
1 SOLUTION INTRAVENOUS ONCE
Status: DISCONTINUED | OUTPATIENT
Start: 2025-03-04 | End: 2025-03-04 | Stop reason: HOSPADM

## 2025-03-04 RX ORDER — GABAPENTIN 100 MG/1
100 CAPSULE ORAL 3 TIMES DAILY
Status: DISCONTINUED | OUTPATIENT
Start: 2025-03-04 | End: 2025-03-09 | Stop reason: HOSPADM

## 2025-03-04 RX ORDER — OXYCODONE HYDROCHLORIDE 5 MG/1
5 TABLET ORAL EVERY 4 HOURS PRN
Status: DISCONTINUED | OUTPATIENT
Start: 2025-03-04 | End: 2025-03-08

## 2025-03-04 RX ORDER — NALOXONE HYDROCHLORIDE 0.4 MG/ML
0.2 INJECTION, SOLUTION INTRAMUSCULAR; INTRAVENOUS; SUBCUTANEOUS EVERY 5 MIN PRN
Status: DISCONTINUED | OUTPATIENT
Start: 2025-03-04 | End: 2025-03-09 | Stop reason: HOSPADM

## 2025-03-04 RX ORDER — PANTOPRAZOLE SODIUM 40 MG/1
40 TABLET, DELAYED RELEASE ORAL
Status: DISCONTINUED | OUTPATIENT
Start: 2025-03-05 | End: 2025-03-09 | Stop reason: HOSPADM

## 2025-03-04 RX ORDER — PROPOFOL 10 MG/ML
INJECTION, EMULSION INTRAVENOUS AS NEEDED
Status: DISCONTINUED | OUTPATIENT
Start: 2025-03-04 | End: 2025-03-04

## 2025-03-04 RX ORDER — ESMOLOL HYDROCHLORIDE 10 MG/ML
INJECTION INTRAVENOUS AS NEEDED
Status: DISCONTINUED | OUTPATIENT
Start: 2025-03-04 | End: 2025-03-04

## 2025-03-04 RX ORDER — ONDANSETRON HYDROCHLORIDE 2 MG/ML
INJECTION, SOLUTION INTRAVENOUS AS NEEDED
Status: DISCONTINUED | OUTPATIENT
Start: 2025-03-04 | End: 2025-03-04

## 2025-03-04 RX ORDER — LABETALOL HYDROCHLORIDE 5 MG/ML
INJECTION, SOLUTION INTRAVENOUS AS NEEDED
Status: DISCONTINUED | OUTPATIENT
Start: 2025-03-04 | End: 2025-03-04

## 2025-03-04 RX ORDER — SODIUM CHLORIDE, SODIUM LACTATE, POTASSIUM CHLORIDE, CALCIUM CHLORIDE 600; 310; 30; 20 MG/100ML; MG/100ML; MG/100ML; MG/100ML
100 INJECTION, SOLUTION INTRAVENOUS CONTINUOUS
Status: DISCONTINUED | OUTPATIENT
Start: 2025-03-04 | End: 2025-03-04 | Stop reason: HOSPADM

## 2025-03-04 RX ORDER — METHOCARBAMOL 500 MG/1
500 TABLET, FILM COATED ORAL EVERY 8 HOURS PRN
Status: DISCONTINUED | OUTPATIENT
Start: 2025-03-04 | End: 2025-03-07

## 2025-03-04 RX ORDER — ACETAMINOPHEN 325 MG/1
975 TABLET ORAL EVERY 6 HOURS SCHEDULED
Status: DISCONTINUED | OUTPATIENT
Start: 2025-03-05 | End: 2025-03-09 | Stop reason: HOSPADM

## 2025-03-04 RX ORDER — ACETAMINOPHEN 325 MG/1
650 TABLET ORAL EVERY 4 HOURS PRN
Status: DISCONTINUED | OUTPATIENT
Start: 2025-03-04 | End: 2025-03-04 | Stop reason: HOSPADM

## 2025-03-04 RX ORDER — HYDROCHLOROTHIAZIDE 25 MG/1
240 TABLET ORAL DAILY
Status: DISCONTINUED | OUTPATIENT
Start: 2025-03-05 | End: 2025-03-09 | Stop reason: HOSPADM

## 2025-03-04 RX ORDER — ENOXAPARIN SODIUM 100 MG/ML
40 INJECTION SUBCUTANEOUS DAILY
Status: DISCONTINUED | OUTPATIENT
Start: 2025-03-05 | End: 2025-03-09 | Stop reason: HOSPADM

## 2025-03-04 RX ORDER — METRONIDAZOLE 500 MG/100ML
500 INJECTION, SOLUTION INTRAVENOUS ONCE
Status: DISCONTINUED | OUTPATIENT
Start: 2025-03-04 | End: 2025-03-04 | Stop reason: HOSPADM

## 2025-03-04 RX ORDER — ONDANSETRON 4 MG/1
4 TABLET, ORALLY DISINTEGRATING ORAL EVERY 8 HOURS PRN
Status: DISCONTINUED | OUTPATIENT
Start: 2025-03-04 | End: 2025-03-09 | Stop reason: HOSPADM

## 2025-03-04 RX ORDER — CEFAZOLIN 1 G/1
INJECTION, POWDER, FOR SOLUTION INTRAVENOUS AS NEEDED
Status: DISCONTINUED | OUTPATIENT
Start: 2025-03-04 | End: 2025-03-04

## 2025-03-04 RX ORDER — LIDOCAINE HYDROCHLORIDE 20 MG/ML
INJECTION, SOLUTION EPIDURAL; INFILTRATION; INTRACAUDAL; PERINEURAL AS NEEDED
Status: DISCONTINUED | OUTPATIENT
Start: 2025-03-04 | End: 2025-03-04

## 2025-03-04 RX ORDER — OXYBUTYNIN CHLORIDE 5 MG/1
5 TABLET ORAL 2 TIMES DAILY
Status: DISCONTINUED | OUTPATIENT
Start: 2025-03-04 | End: 2025-03-09 | Stop reason: HOSPADM

## 2025-03-04 RX ADMIN — SUGAMMADEX 200 MG: 100 INJECTION, SOLUTION INTRAVENOUS at 19:49

## 2025-03-04 RX ADMIN — ROCURONIUM BROMIDE 10 MG: 10 INJECTION, SOLUTION INTRAVENOUS at 18:49

## 2025-03-04 RX ADMIN — ROCURONIUM BROMIDE 70 MG: 10 INJECTION, SOLUTION INTRAVENOUS at 18:00

## 2025-03-04 RX ADMIN — HYDRALAZINE HYDROCHLORIDE 5 MG: 20 INJECTION INTRAMUSCULAR; INTRAVENOUS at 18:52

## 2025-03-04 RX ADMIN — ONDANSETRON 4 MG: 2 INJECTION, SOLUTION INTRAMUSCULAR; INTRAVENOUS at 19:19

## 2025-03-04 RX ADMIN — ROCURONIUM BROMIDE 20 MG: 10 INJECTION, SOLUTION INTRAVENOUS at 19:08

## 2025-03-04 RX ADMIN — ESMOLOL HYDROCHLORIDE 30 MG: 100 INJECTION, SOLUTION INTRAVENOUS at 18:43

## 2025-03-04 RX ADMIN — HYDROMORPHONE HYDROCHLORIDE 0.5 MG: 1 INJECTION, SOLUTION INTRAMUSCULAR; INTRAVENOUS; SUBCUTANEOUS at 19:00

## 2025-03-04 RX ADMIN — FENTANYL CITRATE 100 MCG: 50 INJECTION, SOLUTION INTRAMUSCULAR; INTRAVENOUS at 18:00

## 2025-03-04 RX ADMIN — LABETALOL HYDROCHLORIDE 10 MG: 5 INJECTION INTRAVENOUS at 18:47

## 2025-03-04 RX ADMIN — SODIUM CHLORIDE, POTASSIUM CHLORIDE, SODIUM LACTATE AND CALCIUM CHLORIDE: 600; 310; 30; 20 INJECTION, SOLUTION INTRAVENOUS at 18:33

## 2025-03-04 RX ADMIN — HYDRALAZINE HYDROCHLORIDE 10 MG: 20 INJECTION INTRAMUSCULAR; INTRAVENOUS at 19:55

## 2025-03-04 RX ADMIN — SODIUM CHLORIDE 40 ML/HR: 9 INJECTION, SOLUTION INTRAVENOUS at 23:00

## 2025-03-04 RX ADMIN — FENTANYL CITRATE 50 MCG: 50 INJECTION, SOLUTION INTRAMUSCULAR; INTRAVENOUS at 19:08

## 2025-03-04 RX ADMIN — HYDRALAZINE HYDROCHLORIDE 5 MG: 20 INJECTION INTRAMUSCULAR; INTRAVENOUS at 19:11

## 2025-03-04 RX ADMIN — DEXAMETHASONE SODIUM PHOSPHATE 4 MG: 4 INJECTION, SOLUTION INTRAMUSCULAR; INTRAVENOUS at 19:14

## 2025-03-04 RX ADMIN — HYDROMORPHONE HYDROCHLORIDE 0.5 MG: 1 INJECTION, SOLUTION INTRAMUSCULAR; INTRAVENOUS; SUBCUTANEOUS at 18:40

## 2025-03-04 RX ADMIN — SODIUM CHLORIDE, POTASSIUM CHLORIDE, SODIUM LACTATE AND CALCIUM CHLORIDE: 600; 310; 30; 20 INJECTION, SOLUTION INTRAVENOUS at 17:52

## 2025-03-04 RX ADMIN — CEFAZOLIN 2 G: 1 INJECTION, POWDER, FOR SOLUTION INTRAMUSCULAR; INTRAVENOUS at 18:07

## 2025-03-04 RX ADMIN — LIDOCAINE HYDROCHLORIDE 100 MG: 20 INJECTION, SOLUTION EPIDURAL; INFILTRATION; INTRACAUDAL; PERINEURAL at 18:00

## 2025-03-04 RX ADMIN — HYDROMORPHONE HYDROCHLORIDE 0.25 MG: 1 INJECTION, SOLUTION INTRAMUSCULAR; INTRAVENOUS; SUBCUTANEOUS at 20:49

## 2025-03-04 RX ADMIN — ONDANSETRON 4 MG: 4 TABLET, ORALLY DISINTEGRATING ORAL at 22:23

## 2025-03-04 RX ADMIN — GABAPENTIN 100 MG: 100 CAPSULE ORAL at 22:26

## 2025-03-04 RX ADMIN — LABETALOL HYDROCHLORIDE 10 MG: 5 INJECTION INTRAVENOUS at 18:56

## 2025-03-04 RX ADMIN — HEPARIN SODIUM 5000 UNITS: 5000 INJECTION, SOLUTION INTRAVENOUS; SUBCUTANEOUS at 11:34

## 2025-03-04 RX ADMIN — PROPOFOL 200 MG: 10 INJECTION, EMULSION INTRAVENOUS at 18:00

## 2025-03-04 RX ADMIN — OXYCODONE HYDROCHLORIDE 5 MG: 5 TABLET ORAL at 22:25

## 2025-03-04 RX ADMIN — OXYBUTYNIN CHLORIDE 5 MG: 5 TABLET ORAL at 22:25

## 2025-03-04 RX ADMIN — DIPHENHYDRAMINE HYDROCHLORIDE 25 MG: 50 INJECTION, SOLUTION INTRAMUSCULAR; INTRAVENOUS at 20:53

## 2025-03-04 RX ADMIN — ESMOLOL HYDROCHLORIDE 30 MG: 100 INJECTION, SOLUTION INTRAVENOUS at 18:04

## 2025-03-04 SDOH — HEALTH STABILITY: MENTAL HEALTH: CURRENT SMOKER: 0

## 2025-03-04 ASSESSMENT — COGNITIVE AND FUNCTIONAL STATUS - GENERAL
DAILY ACTIVITIY SCORE: 24
MOBILITY SCORE: 24
DAILY ACTIVITIY SCORE: 24
PATIENT BASELINE BEDBOUND: NO
MOBILITY SCORE: 24

## 2025-03-04 ASSESSMENT — PAIN - FUNCTIONAL ASSESSMENT
PAIN_FUNCTIONAL_ASSESSMENT: UNABLE TO SELF-REPORT
PAIN_FUNCTIONAL_ASSESSMENT: UNABLE TO SELF-REPORT
PAIN_FUNCTIONAL_ASSESSMENT: 0-10
PAIN_FUNCTIONAL_ASSESSMENT: UNABLE TO SELF-REPORT

## 2025-03-04 ASSESSMENT — ACTIVITIES OF DAILY LIVING (ADL)
ADEQUATE_TO_COMPLETE_ADL: YES
FEEDING YOURSELF: INDEPENDENT
TOILETING: INDEPENDENT
HEARING - LEFT EAR: FUNCTIONAL
HEARING - RIGHT EAR: FUNCTIONAL
JUDGMENT_ADEQUATE_SAFELY_COMPLETE_DAILY_ACTIVITIES: YES
WALKS IN HOME: INDEPENDENT
DRESSING YOURSELF: INDEPENDENT
BATHING: INDEPENDENT
PATIENT'S MEMORY ADEQUATE TO SAFELY COMPLETE DAILY ACTIVITIES?: YES
GROOMING: INDEPENDENT

## 2025-03-04 ASSESSMENT — COLUMBIA-SUICIDE SEVERITY RATING SCALE - C-SSRS
2. HAVE YOU ACTUALLY HAD ANY THOUGHTS OF KILLING YOURSELF?: NO
1. IN THE PAST MONTH, HAVE YOU WISHED YOU WERE DEAD OR WISHED YOU COULD GO TO SLEEP AND NOT WAKE UP?: NO
6. HAVE YOU EVER DONE ANYTHING, STARTED TO DO ANYTHING, OR PREPARED TO DO ANYTHING TO END YOUR LIFE?: NO

## 2025-03-04 ASSESSMENT — PAIN SCALES - GENERAL
PAINLEVEL_OUTOF10: 0 - NO PAIN
PAINLEVEL_OUTOF10: 6
PAINLEVEL_OUTOF10: 0 - NO PAIN
PAINLEVEL_OUTOF10: 5 - MODERATE PAIN
PAINLEVEL_OUTOF10: 5 - MODERATE PAIN

## 2025-03-04 ASSESSMENT — PAIN DESCRIPTION - DESCRIPTORS: DESCRIPTORS: SORE

## 2025-03-04 ASSESSMENT — PAIN DESCRIPTION - LOCATION: LOCATION: ABDOMEN

## 2025-03-04 NOTE — H&P
Freda Douglas is a 64 year old female referred by Angeles Harris NP for evaluation of rectal prolapse     History of a sacrectomy for a sacral mass at [facility] and again at  in the early 1990's.     The pathology was positive for cancer and she had to do radiation. After her 2nd surgery to remove the residual of the mass, she had to have a wound vac. She has had accidents of stool since the sacral mass was removed. . She has only had 1 accident/week since she has been taking the Metamucil otherwise it was daily. She tried Imodium but it did not help to bulk her up.      Is under the care of Dr. Calloway for urinary incontinence, vaginal prolapse, enterocele, and rectocele.  She has started 12 week PTNS and she is 3 weeks in - she has not noticed any difference yet.  She has cut back on drinking water to get up less at night.        She takes metamucil and miralax and if she did not have a dulcolax.  She has constant loose BM's now.    She usually can't have a BM without medications.  Initially after the surgery she had to intermittent self cath and had to do water enemas, and then she did medications to have a BM. She has issues having a BM since the surgery.  She would have fecal incontinence at least once a week after the surgery unexpectedly.       First noticed rectal prolapse January 2022, and now it is out all the time.  Mucous blood all the time.    She was suppose to have the prolapse repaired by Dr. Mondragon 2 years ago but wanted to hold off.     8/16/2022 Colonoscopy to cecum  The perianal and digital rectal examinations were normal.  The terminal ileum appeared normal.  Localized area of erythematous sloughing mucosa was present in the rectum. Biopsies were taken with a cold forceps for histology. The pathology specimen was placed into Bottle A.  Internal hemorrhoids were found during retroflexion. The hemorrhoids were small.  Pathology:   A.  RECTAL BIOPSY:    -- COLONIC MUCOSA WITH EROSIONS,  GRANULATION TISSUE FORMATION, ASSOCIATED ACUTE   AND CHRONIC INFLAMMATION AND REACTIVE CHANGE, SEE NOTE.   Note: There are rare small thrombi in the capsular vessels.  No dysplasia or viral inclusions are identified.         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   Urinary incontinence  HTN - difficult to control  GERD  Anxiety   Clawed toes      Surgical History  Sacrectomy and XRT to the area   T+A   Bladder suspension - didn't help  Tubal ligation      Social History  Smoking:  Never   ETOH: A couple of glasses of wine/day   Disabled since the sacrectomy     Family History  Mom: stroke,    Dad: Lung Ca   Brother: prostate CA  Brother: CHF  Daughter: HTN     Review of Systems  Constitutional: Negative for fever, chills, anorexia, weight loss, malaise             ENMT: Negative for nasal discharge, congestion, ear pain, mouth pain, throat pain                 Respiratory: Negative for cough, hemoptysis, wheezing, shortness of breath                Cardiac: Negative for chest pain, dyspnea on exertion, orthopnea, palpitations, syncope         Gastrointestinal: Negative for nausea, vomiting, diarrhea, constipation, abdominal pain, (+)RECTAL PROLAPSE, (+)GERM CELL TUMOR OF SACRUM, S/P EXCISION     Genitourinary: Negative for discharge, dysuria, flank pain, frequency, hematuria, (+)URINARY INCONTINENCE     Musculoskeletal: Negative for decreased ROM, pain, swelling, weakness          Neurological: Negative for dizziness, confusion, headache, seizures, syncope               Psychiatric: Negative for mood changes, anxiety, hallucinations, sleep changes, suicidal ideas        Skin: Negative for mass, pain, itching, rash, ulcer            Endocrine: Negative for heat intolerance, cold intolerance, excessive sweating, polyuria, excess thirst         Hematologic/Lymph: Negative for anemia, bruising, easy bleeding, night sweats, petechiae, history of DVT/PE or cancer                Allergic/Immunologic: Negative for anaphylaxis, itchy/ teary eyes, itching, sneezing, swelling     Physical Exam  Constitutional: Well developed, awake/alert/oriented x3, no distress, alert and cooperative             Eyes: Sclera anicteric, no conjunctival inflammation, conjugate gaze    ENMT: mucous membranes moist, no apparent injury,            Head/Neck: Neck supple, no apparent injury, No JVD, trachea midline, no bruits              Respiratory/Thorax: Patent airways, CTAB, normal breath sounds with good chest expansion, thorax symmetric         Cardiovascular: Regular, rate and rhythm, no murmurs, normal S1 and S2         Gastrointestinal: Nondistended, soft, non-tender, no rebound tenderness or guarding, no masses palpable, no organomegaly, +BS, no bruits               Extremities: normal extremities, no cyanosis edema, contusions or wounds, 2+ femoral pulses B/L              Neurological: alert and oriented x3, normal strength, Normal gait          Lymphatic: No palpable inguinal lymphadenopathy   Psychological: Appropriate mood and behavior         Skin: Warm and dry, no lesions, no rashes                Anorectal: There is mucous on the skin, the skin otherwise appears normal.  Gaping anus,      Impression: Pt s/p sacrectomy for tumor and XRT with no anal tone and rectal prolapse and severe fecal incontinence for years with mixed urinary incontinence as well since her surgery.    Since we are unable to address the the pelvic floor dysfunction as that is surgical I think that recurrent prolapse and continued incontinence is likely.   We discussed things like SNS being a possibility, but unlikely to work with her sacrectomy (as S4 has been resected)      Plan:  CT scan to evaluate her anatomy as she has not had one in >10 years and for abdominal pain/constipation.   Discussed rectopexy vs proctectomy and end colostomy  Stoma nurse referral   She will be unable to do the bowel prep perioperatively.     Continue metamucil.

## 2025-03-04 NOTE — ANESTHESIA PROCEDURE NOTES
Airway  Date/Time: 3/4/2025 6:01 PM  Urgency: elective    Airway not difficult    Staffing  Performed: CRNA   Authorized by: Alvaro Glass MD    Performed by: ANNE Elam-MABEL  Patient location during procedure: OR    Indications and Patient Condition  Indications for airway management: anesthesia  Sedation level: deep  Preoxygenated: yes  Patient position: sniffing  Mask difficulty assessment: 1 - vent by mask    Final Airway Details  Final airway type: endotracheal airway      Successful airway: ETT     Successful intubation technique: video laryngoscopy  Endotracheal tube insertion site: oral  Blade: Janet  Blade size: #3  ETT size (mm): 7.0  Cormack-Lehane Classification: grade I - full view of glottis  Placement verified by: capnometry   Measured from: lips  ETT to lips (cm): 21  Number of attempts at approach: 1

## 2025-03-04 NOTE — ANESTHESIA PREPROCEDURE EVALUATION
Patient: Freda Douglas    Procedure Information       Date/Time: 25 1200    Procedure: Robotic Proctectomy; End Colostomy    Location: [facility] OR  [facility] OR    Surgeons: Rosie Frias MD            Relevant Problems   Cardiac   (+) Chest pain   (+) Hyperlipidemia   (+) Hypertension      Neuro   (+) Anxiety disorder   (+) Carpal tunnel syndrome of left wrist      GI   (+) Gastroesophageal reflux disease      Liver   (+) Cholelithiasis   (+) Steatosis, liver      Hematology   (+) Achlorhydric anemia      Musculoskeletal   (+) Carpal tunnel syndrome of left wrist       Clinical information reviewed:   Tobacco  Allergies  Meds   Med Hx  Surg Hx  OB Status  Fam Hx  Soc   Hx         Past Medical History:   Diagnosis Date   • Anxiety    • Anxiety disorder    • Arthritis    • Awareness under anesthesia    • Fecal incontinence    • GERD (gastroesophageal reflux disease)    • History of blood transfusion    • Hyperlipidemia    • Hypertension    • Malignant neoplasm of pelvic bones, sacrum and coccyx (Multi)     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 in the    • Obesity    • Pelvic floor weakness    • Rectal prolapse     Plan: Robotic Proctectomy; End Colostomy 3/4/25   • Spinal headache    • Urinary incontinence, mixed    • Vitamin D deficiency       Past Surgical History:   Procedure Laterality Date   • ADENOIDECTOMY     • BLADDER SURGERY     • CARPAL TUNNEL RELEASE Bilateral    • COLONOSCOPY     • SOFT TISSUE MASS EXCISION      Pelvic Excision Of Soft Tissue Tumor   • SPINE SURGERY     • TONSILLECTOMY     • TUBAL LIGATION     • WISDOM TOOTH EXTRACTION       Social History     Tobacco Use   • Smoking status: Former     Current packs/day: 0.00     Average packs/day: 0.5 packs/day for 4.0 years (2.0 ttl pk-yrs)     Types: Cigarettes     Start date:      Quit date:      Years since quittin.2   • Smokeless tobacco: Never    Vaping Use   • Vaping status: Never Used   Substance Use Topics   • Alcohol use: Yes     Alcohol/week: 8.0 standard drinks of alcohol     Types: 8 Glasses of wine per week   • Drug use: Not Currently     Types: Marijuana      Current Outpatient Medications   Medication Instructions   • atorvastatin (LIPITOR) 20 mg, oral, Daily   • chlorhexidine (Peridex) 0.12 % solution Rinse mouth with 15 ml after toothbrushing the night before surgery and on the morning of surgery.  Expectorate after rinsing.  Do not swallow.   • cloNIDine (CATAPRES) 0.1 mg, oral, 4 times daily   • gabapentin (Neurontin) 100 mg capsule Take one capsule by mouth starting three days before surgery at bedtime.   • metroNIDAZOLE (Flagyl) 250 mg tablet Take one tablet at 6p, 7p, and 11p the night before surgery.   • neomycin (Mycifradin) 500 mg tablet Take two tabs by mouth at 6p, 7pm, and 11p the night before surgery.   • omeprazole (PriLOSEC) 20 mg DR capsule TAKE 1 CAPSULE DAILY. DO   NOT CRUSH OR CHEW   • oxybutynin (DITROPAN) 5 mg, oral, 2 times daily   • triamterene-hydrochlorothiazid (Dyazide) 37.5-25 mg capsule 1 capsule, oral, Every morning   • triamterene-hydrochlorothiazid (Dyazide) 37.5-25 mg capsule 1 capsule, oral, Every morning   • verapamil SR (CALAN-SR) 240 mg, oral, Daily      Allergies   Allergen Reactions   • Sulfa (Sulfonamide Antibiotics) Hives        Chemistry    Lab Results   Component Value Date/Time     02/27/2025 1024    K 3.3 (L) 02/27/2025 1024     02/27/2025 1024    CO2 31 02/27/2025 1024    BUN 17 02/27/2025 1024    CREATININE 0.69 02/27/2025 1024    Lab Results   Component Value Date/Time    CALCIUM 9.4 02/27/2025 1024    ALKPHOS 107 02/27/2025 1024    AST 16 02/27/2025 1024    ALT 13 02/27/2025 1024    BILITOT 0.7 02/27/2025 1024          Lab Results   Component Value Date    HGBA1C 6.1 09/08/2017     Lab Results   Component Value Date/Time    WBC 6.2 02/27/2025 1024    WBC 7.92 10/11/2024 1130    HGB 12.5  "02/27/2025 1024    HGB 13.77 10/11/2024 1130    HCT 38.3 02/27/2025 1024    HCT 40.9 10/11/2024 1130     02/27/2025 1024    .2 10/11/2024 1130     No results found for: \"PROTIME\", \"PTT\", \"INR\"  No results found for: \"ABORH\"  Encounter Date: 02/27/25   ECG 12 Lead   Result Value    Ventricular Rate 67    Atrial Rate 67    SD Interval 152    QRS Duration 76    QT Interval 390    QTC Calculation(Bazett) 412    P Axis 33    R Axis 57    T Axis 44    QRS Count 11    Q Onset 221    P Onset 145    P Offset 187    T Offset 416    QTC Fredericia 404    Narrative    Normal sinus rhythm  Nonspecific T wave abnormality  Abnormal ECG  When compared with ECG of 04-FEB-2008 21:26,  Vent. rate has decreased BY  35 BPM  Non-specific change in ST segment in Inferior leads  Non-specific change in ST segment in Anterolateral leads  T wave inversion no longer evident in Inferior leads  T wave inversion no longer evident in Anterior leads  Confirmed by Steven Espino ([id number]) on 2/27/2025 11:01:55 AM     No results found for this or any previous visit from the past 1095 days.       Visit Vitals  /77   Pulse 69   Temp 36 °C (96.8 °F) (Temporal)   Resp 16   Ht 1.6 m (5' 3\")   Wt 74.1 kg (163 lb 5.8 oz)   SpO2 98%   BMI 28.94 kg/m²   OB Status Postmenopausal   Smoking Status Former   BSA 1.81 m²     NPO/Void Status  Carbohydrate Drink Given Prior to Surgery? : Y  Date of Last Liquid: 03/04/25  Time of Last Liquid: 0800  Date of Last Solid: 03/02/25  Time of Last Solid: 1900  Last Intake Type: Carbohydrate drink  Time of Last Void: 0930        Physical Exam    Airway  Mallampati: II  TM distance: >3 FB  Neck ROM: full     Cardiovascular - normal exam     Dental - normal exam     Pulmonary - normal exam     Abdominal - normal exam  (+) obese         Anesthesia Plan    History of general anesthesia?: yes  History of complications of general anesthesia?: no    ASA 2     general   (Hx of Anesthesia Awareness)  The patient is not " a current smoker.    intravenous induction   Postoperative administration of opioids is intended.  Anesthetic plan and risks discussed with patient.  Use of blood products discussed with patient who.    Plan discussed with CAA and attending.

## 2025-03-05 LAB
ANION GAP SERPL CALC-SCNC: 12 MMOL/L (ref 10–20)
BUN SERPL-MCNC: 9 MG/DL (ref 6–23)
CALCIUM SERPL-MCNC: 8.5 MG/DL (ref 8.6–10.3)
CHLORIDE SERPL-SCNC: 99 MMOL/L (ref 98–107)
CO2 SERPL-SCNC: 30 MMOL/L (ref 21–32)
CREAT SERPL-MCNC: 0.66 MG/DL (ref 0.5–1.05)
EGFRCR SERPLBLD CKD-EPI 2021: >90 ML/MIN/1.73M*2
ERYTHROCYTE [DISTWIDTH] IN BLOOD BY AUTOMATED COUNT: 13.4 % (ref 11.5–14.5)
GLUCOSE SERPL-MCNC: 122 MG/DL (ref 74–99)
HCT VFR BLD AUTO: 36.1 % (ref 36–46)
HGB BLD-MCNC: 12 G/DL (ref 12–16)
MCH RBC QN AUTO: 27.7 PG (ref 26–34)
MCHC RBC AUTO-ENTMCNC: 33.2 G/DL (ref 32–36)
MCV RBC AUTO: 83 FL (ref 80–100)
NRBC BLD-RTO: 0 /100 WBCS (ref 0–0)
PLATELET # BLD AUTO: 349 X10*3/UL (ref 150–450)
POTASSIUM SERPL-SCNC: 2.9 MMOL/L (ref 3.5–5.3)
RBC # BLD AUTO: 4.33 X10*6/UL (ref 4–5.2)
SODIUM SERPL-SCNC: 138 MMOL/L (ref 136–145)
WBC # BLD AUTO: 17.8 X10*3/UL (ref 4.4–11.3)

## 2025-03-05 PROCEDURE — 99232 SBSQ HOSP IP/OBS MODERATE 35: CPT | Performed by: STUDENT IN AN ORGANIZED HEALTH CARE EDUCATION/TRAINING PROGRAM

## 2025-03-05 PROCEDURE — 36415 COLL VENOUS BLD VENIPUNCTURE: CPT | Performed by: STUDENT IN AN ORGANIZED HEALTH CARE EDUCATION/TRAINING PROGRAM

## 2025-03-05 PROCEDURE — 2500000001 HC RX 250 WO HCPCS SELF ADMINISTERED DRUGS (ALT 637 FOR MEDICARE OP): Performed by: STUDENT IN AN ORGANIZED HEALTH CARE EDUCATION/TRAINING PROGRAM

## 2025-03-05 PROCEDURE — 2500000002 HC RX 250 W HCPCS SELF ADMINISTERED DRUGS (ALT 637 FOR MEDICARE OP, ALT 636 FOR OP/ED): Performed by: STUDENT IN AN ORGANIZED HEALTH CARE EDUCATION/TRAINING PROGRAM

## 2025-03-05 PROCEDURE — 1100000001 HC PRIVATE ROOM DAILY

## 2025-03-05 PROCEDURE — 85027 COMPLETE CBC AUTOMATED: CPT | Performed by: STUDENT IN AN ORGANIZED HEALTH CARE EDUCATION/TRAINING PROGRAM

## 2025-03-05 PROCEDURE — 2500000002 HC RX 250 W HCPCS SELF ADMINISTERED DRUGS (ALT 637 FOR MEDICARE OP, ALT 636 FOR OP/ED): Performed by: COLON & RECTAL SURGERY

## 2025-03-05 PROCEDURE — 82374 ASSAY BLOOD CARBON DIOXIDE: CPT | Performed by: STUDENT IN AN ORGANIZED HEALTH CARE EDUCATION/TRAINING PROGRAM

## 2025-03-05 PROCEDURE — 2500000004 HC RX 250 GENERAL PHARMACY W/ HCPCS (ALT 636 FOR OP/ED): Performed by: STUDENT IN AN ORGANIZED HEALTH CARE EDUCATION/TRAINING PROGRAM

## 2025-03-05 PROCEDURE — 9420000001 HC RT PATIENT EDUCATION 5 MIN

## 2025-03-05 PROCEDURE — 2500000004 HC RX 250 GENERAL PHARMACY W/ HCPCS (ALT 636 FOR OP/ED)

## 2025-03-05 RX ORDER — TAMSULOSIN HYDROCHLORIDE 0.4 MG/1
0.4 CAPSULE ORAL DAILY
Status: DISCONTINUED | OUTPATIENT
Start: 2025-03-05 | End: 2025-03-09 | Stop reason: HOSPADM

## 2025-03-05 RX ORDER — POTASSIUM CHLORIDE 20 MEQ/1
40 TABLET, EXTENDED RELEASE ORAL ONCE
Status: COMPLETED | OUTPATIENT
Start: 2025-03-05 | End: 2025-03-05

## 2025-03-05 RX ADMIN — GABAPENTIN 100 MG: 100 CAPSULE ORAL at 20:59

## 2025-03-05 RX ADMIN — GABAPENTIN 100 MG: 100 CAPSULE ORAL at 08:45

## 2025-03-05 RX ADMIN — OXYBUTYNIN CHLORIDE 5 MG: 5 TABLET ORAL at 20:59

## 2025-03-05 RX ADMIN — PANTOPRAZOLE SODIUM 40 MG: 40 TABLET, DELAYED RELEASE ORAL at 06:20

## 2025-03-05 RX ADMIN — ACETAMINOPHEN 975 MG: 325 TABLET, FILM COATED ORAL at 11:59

## 2025-03-05 RX ADMIN — OXYCODONE HYDROCHLORIDE 5 MG: 5 TABLET ORAL at 08:45

## 2025-03-05 RX ADMIN — ACETAMINOPHEN 975 MG: 325 TABLET, FILM COATED ORAL at 06:21

## 2025-03-05 RX ADMIN — OXYCODONE HYDROCHLORIDE 10 MG: 5 TABLET ORAL at 16:15

## 2025-03-05 RX ADMIN — METHOCARBAMOL 500 MG: 500 TABLET ORAL at 20:59

## 2025-03-05 RX ADMIN — ACETAMINOPHEN 975 MG: 325 TABLET, FILM COATED ORAL at 17:34

## 2025-03-05 RX ADMIN — CLONIDINE HYDROCHLORIDE 0.1 MG: 0.1 TABLET ORAL at 06:21

## 2025-03-05 RX ADMIN — POTASSIUM CHLORIDE 40 MEQ: 1500 TABLET, EXTENDED RELEASE ORAL at 08:45

## 2025-03-05 RX ADMIN — TAMSULOSIN HYDROCHLORIDE 0.4 MG: 0.4 CAPSULE ORAL at 08:45

## 2025-03-05 RX ADMIN — VERAPAMIL HYDROCHLORIDE 240 MG: 120 TABLET ORAL at 08:44

## 2025-03-05 RX ADMIN — ENOXAPARIN SODIUM 40 MG: 40 INJECTION SUBCUTANEOUS at 08:44

## 2025-03-05 RX ADMIN — GABAPENTIN 100 MG: 100 CAPSULE ORAL at 14:04

## 2025-03-05 RX ADMIN — OXYBUTYNIN CHLORIDE 5 MG: 5 TABLET ORAL at 08:45

## 2025-03-05 RX ADMIN — CLONIDINE HYDROCHLORIDE 0.1 MG: 0.1 TABLET ORAL at 16:15

## 2025-03-05 RX ADMIN — CLONIDINE HYDROCHLORIDE 0.1 MG: 0.1 TABLET ORAL at 12:00

## 2025-03-05 RX ADMIN — ACETAMINOPHEN 975 MG: 325 TABLET, FILM COATED ORAL at 00:17

## 2025-03-05 RX ADMIN — SODIUM CHLORIDE 500 ML: 9 INJECTION, SOLUTION INTRAVENOUS at 21:13

## 2025-03-05 SDOH — HEALTH STABILITY: MENTAL HEALTH: HOW OFTEN DO YOU HAVE A DRINK CONTAINING ALCOHOL?: MONTHLY OR LESS

## 2025-03-05 SDOH — ECONOMIC STABILITY: HOUSING INSECURITY: IN THE LAST 12 MONTHS, WAS THERE A TIME WHEN YOU WERE NOT ABLE TO PAY THE MORTGAGE OR RENT ON TIME?: NO

## 2025-03-05 SDOH — ECONOMIC STABILITY: FOOD INSECURITY: WITHIN THE PAST 12 MONTHS, THE FOOD YOU BOUGHT JUST DIDN'T LAST AND YOU DIDN'T HAVE MONEY TO GET MORE.: NEVER TRUE

## 2025-03-05 SDOH — ECONOMIC STABILITY: FOOD INSECURITY: WITHIN THE PAST 12 MONTHS, YOU WORRIED THAT YOUR FOOD WOULD RUN OUT BEFORE YOU GOT THE MONEY TO BUY MORE.: NEVER TRUE

## 2025-03-05 SDOH — ECONOMIC STABILITY: TRANSPORTATION INSECURITY: IN THE PAST 12 MONTHS, HAS LACK OF TRANSPORTATION KEPT YOU FROM MEDICAL APPOINTMENTS OR FROM GETTING MEDICATIONS?: NO

## 2025-03-05 SDOH — SOCIAL STABILITY: SOCIAL INSECURITY: ARE YOU MARRIED, WIDOWED, DIVORCED, SEPARATED, NEVER MARRIED, OR LIVING WITH A PARTNER?: WIDOWED

## 2025-03-05 SDOH — HEALTH STABILITY: MENTAL HEALTH: HOW OFTEN DO YOU HAVE SIX OR MORE DRINKS ON ONE OCCASION?: NEVER

## 2025-03-05 SDOH — ECONOMIC STABILITY: FOOD INSECURITY: HOW HARD IS IT FOR YOU TO PAY FOR THE VERY BASICS LIKE FOOD, HOUSING, MEDICAL CARE, AND HEATING?: NOT HARD AT ALL

## 2025-03-05 SDOH — ECONOMIC STABILITY: INCOME INSECURITY: IN THE PAST 12 MONTHS HAS THE ELECTRIC, GAS, OIL, OR WATER COMPANY THREATENED TO SHUT OFF SERVICES IN YOUR HOME?: NO

## 2025-03-05 SDOH — ECONOMIC STABILITY: HOUSING INSECURITY: IN THE PAST 12 MONTHS, HOW MANY TIMES HAVE YOU MOVED WHERE YOU WERE LIVING?: 0

## 2025-03-05 SDOH — ECONOMIC STABILITY: HOUSING INSECURITY: AT ANY TIME IN THE PAST 12 MONTHS, WERE YOU HOMELESS OR LIVING IN A SHELTER (INCLUDING NOW)?: NO

## 2025-03-05 SDOH — HEALTH STABILITY: MENTAL HEALTH: HOW MANY DRINKS CONTAINING ALCOHOL DO YOU HAVE ON A TYPICAL DAY WHEN YOU ARE DRINKING?: 1 OR 2

## 2025-03-05 ASSESSMENT — PAIN SCALES - GENERAL
PAINLEVEL_OUTOF10: 4
PAINLEVEL_OUTOF10: 5 - MODERATE PAIN
PAINLEVEL_OUTOF10: 7
PAINLEVEL_OUTOF10: 0 - NO PAIN
PAINLEVEL_OUTOF10: 0 - NO PAIN
PAINLEVEL_OUTOF10: 3
PAINLEVEL_OUTOF10: 5 - MODERATE PAIN

## 2025-03-05 ASSESSMENT — PAIN - FUNCTIONAL ASSESSMENT
PAIN_FUNCTIONAL_ASSESSMENT: 0-10

## 2025-03-05 ASSESSMENT — LIFESTYLE VARIABLES
SKIP TO QUESTIONS 9-10: 1
AUDIT-C TOTAL SCORE: 1

## 2025-03-05 ASSESSMENT — ACTIVITIES OF DAILY LIVING (ADL)
LACK_OF_TRANSPORTATION: NO
LACK_OF_TRANSPORTATION: NO

## 2025-03-05 ASSESSMENT — PAIN DESCRIPTION - LOCATION
LOCATION: ABDOMEN
LOCATION: ABDOMEN

## 2025-03-05 ASSESSMENT — PAIN DESCRIPTION - ORIENTATION: ORIENTATION: LEFT

## 2025-03-05 NOTE — ANESTHESIA POSTPROCEDURE EVALUATION
Patient: Freda Douglas    Procedure Summary       Date: 03/04/25 Room / Location: [facility] OR 06 / Virtual [facility] OR    Anesthesia Start: 1752 Anesthesia Stop: 2001    Procedure: Laparoscopic Proctectomy; End Colostomy Diagnosis:       Rectal prolapse      (Rectal prolapse [K62.3])    Surgeons: Rosie Frias MD Responsible Provider: Alvaro Glass MD    Anesthesia Type: general ASA Status: 2            Anesthesia Type: general    Vitals Value Taken Time   /57 03/04/25 2017   Temp 35.7 03/04/25 2024   Pulse 79 03/04/25 2024   Resp 17 03/04/25 2024   SpO2 93 % 03/04/25 2024   Vitals shown include unfiled device data.    Anesthesia Post Evaluation    Patient location during evaluation: PACU  Patient participation: complete - patient participated  Level of consciousness: awake  Pain management: adequate  Airway patency: patent  Cardiovascular status: acceptable  Respiratory status: acceptable  Hydration status: acceptable  Postoperative Nausea and Vomiting: none      No notable events documented.

## 2025-03-05 NOTE — CARE PLAN
Problem: Pain - Adult  Goal: Verbalizes/displays adequate comfort level or baseline comfort level  Outcome: Progressing     Problem: Safety - Adult  Goal: Free from fall injury  Outcome: Progressing     Problem: Discharge Planning  Goal: Discharge to home or other facility with appropriate resources  Outcome: Progressing     Problem: Chronic Conditions and Co-morbidities  Goal: Patient's chronic conditions and co-morbidity symptoms are monitored and maintained or improved  Outcome: Progressing     Problem: Nutrition  Goal: Nutrient intake appropriate for maintaining nutritional needs  Outcome: Progressing     Problem: Pain  Goal: Takes deep breaths with improved pain control throughout the shift  Outcome: Progressing  Goal: Turns in bed with improved pain control throughout the shift  Outcome: Progressing  Goal: Walks with improved pain control throughout the shift  Outcome: Progressing  Goal: Performs ADL's with improved pain control throughout shift  Outcome: Progressing  Goal: Participates in PT with improved pain control throughout the shift  Outcome: Progressing  Goal: Free from opioid side effects throughout the shift  Outcome: Progressing  Goal: Free from acute confusion related to pain meds throughout the shift  Outcome: Progressing

## 2025-03-05 NOTE — CONSULTS
"Ostomy/Wound Care Consult     Visit Date: 3/5/2025      Patient Name: Freda Douglas         MRN: [id number]           YOB: 1960     WO nursing visit outcome: Post op pouch not securely snapped together inferiorly and starting to leak.  Changed pouch and did brief lesson with pt on pouch change procedure.     WOC next scheduled visit/plan: will follow while inpatient.  Plan for formal lesson on Thursday, 3/6.    Stoma Type: End Colostomy  Diameter: 1 1/2\" slightly oblong  Location: LUQ  Protrusion: Budded  Mucosal Condition and Color: Moist, Red  Mucocutaneous Junction: Intact  Peristomal Skin: Clear, intact  Location of Skin Impairment: n/a  Peristomal Contour: Flat  Supportive Tissue: Semi-Soft  Character of Output: Brown and Watery  Emptying Frequency: n/a, just starting to have bowel function  Removed/Current Pouching System: 2 3/4\" ConvaTec moldable flat wafer, barrier ring, Natura Invisiclose pouch    Current Wearing Time: less than 1 Day    Recommendations:   Skin Care: educated how/when to use stoma powder  Pouching System: 2 3/4\" Geronimo New Image flat with Adapt Cera ring and drainable pouch.  Pt will benefit from soft convexity  Wear Time: goal is 3-4 Days  Other: Left 2 1/4\" Geronimo New Image soft convexity wafer and drainable pouches at bedside.    Photo: 3/5/2025       Comments: Pt interested and motivated to learn stoma care and pouching.    Time Increment: 45 minutes    Vinita OCAMPON, RN, CWOCN  3/5/2025  2:44 PM        "

## 2025-03-05 NOTE — PROGRESS NOTES
03/05/25 1137   Discharge Planning   Living Arrangements Alone   Support Systems Children   Assistance Needed none   Type of Residence Private residence   Number of Stairs to Enter Residence 0   Number of Stairs Within Residence 0   Do you have animals or pets at home? Yes   Type of Animals or Pets one dog   Home or Post Acute Services In home services   Type of Home Care Services Home nursing visits   Expected Discharge Disposition Home H   Does the patient need discharge transport arranged? No   Financial Resource Strain   How hard is it for you to pay for the very basics like food, housing, medical care, and heating? Not hard   Housing Stability   In the last 12 months, was there a time when you were not able to pay the mortgage or rent on time? N   In the past 12 months, how many times have you moved where you were living? 0   At any time in the past 12 months, were you homeless or living in a shelter (including now)? N   Transportation Needs   In the past 12 months, has lack of transportation kept you from medical appointments or from getting medications? no   In the past 12 months, has lack of transportation kept you from meetings, work, or from getting things needed for daily living? No   Patient Choice   Provider Choice list and CMS website (https://medicare.gov/care-compare#search) for post-acute Quality and Resource Measure Data were provided and reviewed with: Patient   Patient / Family choosing to utilize agency / facility established prior to hospitalization No   Stroke Family Assessment   Stroke Family Assessment Needed No   Intensity of Service   Intensity of Service 0-30 min     3/5/25 1138  Met with patient at bedside to discuss discharge planning.  Patient lives at home alone in a house.  Her children live about a mile away and can assist her if needed.  She is independent with ADLs and drives at baseline.  She does not use any assistive devices for ambulation.  She plans on returning home at  discharge.  She is agreeable to [facility] for SNF for ostomy care.  Explained C options and expectations with patient.  She would like to use [facility].  ADOD pending RBF.  Louann Ott RN TCC

## 2025-03-05 NOTE — OP NOTE
Laparoscopic Proctectomy; End Colostomy Operative Note     Date: 3/4/2025  OR Location: [facility] OR    Name: Freda Douglas, : 1960, Age: 64 y.o., MRN: [id number], Sex: female    Diagnosis  Pre-op Diagnosis      * Rectal prolapse [K62.3] Post-op Diagnosis     * Rectal prolapse [K62.3]     Procedures  Laparoscopic Proctectomy; End Colostomy  30750 - DC LAPS PROCTECTOMY ABDOMINOPERINEAL W/COLOSTOMY      Surgeons      * Rosie Frias - Primary    Resident/Fellow/Other Assistant:  Surgeons and Role:  * No surgeons found with a matching role *    Staff:   Circulator: Codie  Scrub Person: Trisha Greenberg Scrub: Jil Greenberg Circulator: Jocelyn    Anesthesia Staff: Anesthesiologist: Alvaro Glass MD  CRNA: ANNE Elam-CRNA  C-AA: JURGEN Gary    Procedure Summary  Anesthesia: General  ASA: II  Estimated Blood Loss: 50mL  Intra-op Medications:   Administrations occurring from 1600 to 1930 on 25:   Medication Name Total Dose   BUPivacaine HCl (Marcaine) 0.5 % (5 mg/mL) 30 mL in sodium chloride 0.9% 30 mL syringe 44 mL   sodium chloride 0.9 % irrigation solution 1,000 mL   ceFAZolin (Ancef) vial 1 g 2 g   dexAMETHasone (Decadron) injection 4 mg/mL 4 mg   esmolol (Brevibloc) injection 60 mg   fentaNYL (Sublimaze) injection 50 mcg/mL 150 mcg   hydrALAZINE (Apresoline) injection 10 mg   HYDROmorphone (Dilaudid) injection 1 mg/mL 1 mg   labetalol (Normodyne,Trandate) injection 20 mg   LR bolus Cannot be calculated   lidocaine PF (Xylocaine-MPF) local injection 2 % 100 mg   ondansetron (Zofran) 2 mg/mL injection 4 mg   propofol (Diprivan) injection 10 mg/mL 200 mg   rocuronium (ZeMuron) 50 mg/5 mL injection 100 mg              Anesthesia Record               Intraprocedure I/O Totals          Intake    LR bolus 1300.00 mL    Total Intake 1300 mL       Output    Urine 250 mL    Est. Blood Loss 50 mL    Total Output 300 mL       Net    Net Volume 1000 mL          Specimen:   ID Type Source Tests  Collected by Time   1 : RECTUM AND SIGMOID Tissue COLON - SIGMOID RESECTION SURGICAL PATHOLOGY EXAM Rosie Frias MD 3/4/2025 6169                 Drains and/or Catheters:   Colostomy LLQ (Active)       Urethral Catheter Non-latex 16 Fr. (Active)       Findings: No abdominal adhesions    Indications: Freda Douglas is an 64 y.o. female who is having surgery for Rectal prolapse [K62.3]. And fecal incontinence    The patient was seen in the preoperative area. The risks, benefits, complications, treatment options, non-operative alternatives, expected recovery and outcomes were discussed with the patient. The possibilities of reaction to medication, pulmonary aspiration, injury to surrounding structures, bleeding, recurrent infection, the need for additional procedures, failure to diagnose a condition, and creating a complication requiring transfusion or operation were discussed with the patient. The patient concurred with the proposed plan, giving informed consent.  The site of surgery was properly noted/marked if necessary per policy. The patient has been actively warmed in preoperative area. Preoperative antibiotics have been ordered and given within 1 hours of incision. Venous thrombosis prophylaxis have been ordered including bilateral sequential compression devices and chemical prophylaxis    Procedure Details: The patient was brought to the hospital the operating room and moved to the table in the lithotomy position and general endotracheal anesthesia was induced. IV antibiotics and a heparin shot were administered she was prepped using ChloraPrep was allowed to dry for 3 minutes and taped and draped in usual sterile fashion.      The perineum was prepped using betadine.  4 everting sutures were placed and the prolapse was reduced.  Local was instilled in the intersphincteric groove.  The skin was scored using cut on the bovie cautery.  The intersphincteric groove was developed circumferentially without  bleeding.  The prolapse was then brought back out and the vagina was identified and dissected free from the anterior rectum.  The anterior peritoneal reflection was cut and the peritoneum was entered.  The IMPACT ligasure was used to take the mesorectum around posteriorly and an intramesorectal dissection continued up to the sigmoid colon.  The sigmoid was then transected using a TA 60 blue stapler and the rectum and sigmoid were sent to pathology.     The previously marked left lower quadrant colostomy site was injected with anesthetic and a disc of skin was removed the subcutaneous tissues and anterior rectus sheath were divided using Bovie cautery the muscles were split and the posterior sheath was grasped Using Metzenbaum scissors finger sweep was performed there was no carcinomatosis and no adhesions.  The GelPort wound protector was placed using 3-10 mm ports.  We then inspected the abdominal cavity there were no adhesions.  A four-quadrant tap block was performed instilling 10 cc of quarter percent Marcaine in the right upper quadrant right lower quadrant left upper quadrant left lower quadrant under direct vision.  The staple line was grasped and the sigmoid was mobilized at the sigmoid descending junctio and the colon would easily reach up to the stoma site the colon was grasped using a Genoa and brought out through the stoma site keeping proximal and distal orientation in mind.      The staple like was excised using the Bovie cautery and sutured to the skin using 3-0 Vicryl sutures with good eversion and bag was applied.      Complications:  None; patient tolerated the procedure well.    Disposition: PACU - hemodynamically stable.  Condition: stable     Attending Attestation: I was present and scrubbed for the entire procedure.    Rosie Frias  Phone Number: [phone]

## 2025-03-05 NOTE — CONSULTS
"Nutrition Assessment Note  Nutrition Assessment      Reason for Assessment: Admission nursing screening  Admitted with rectal prolapse. POD#1 for lap proctectomy with end colostomy.  MST score of 2 on admit for weight loss and decreased appetite. Significant weight loss identified.  Patient visited today, asleep with multiple beverage cups next to bed.  Lunch has not been delivered yet.    History:  Energy Intake: Poor < 50 %  Food and Nutrient History: Reported weight loss and decreased appetite PTA on MST.    Diagnosis   Abnormal ultrasound of liver   Achilles tendinitis of left lower extremity   Achlorhydric anemia   Acute bronchitis   Anxiety disorder   Arm pain   Atrophic vaginitis   Abdominal pain   Back pain, acute   Chest pain   Carpal tunnel syndrome of left wrist   Cholelithiasis   Cough   Elevated alkaline phosphatase level   Enterocele   Fatigue   Female stress incontinence   Gastroesophageal reflux disease   Hyperglycemia   Hyperlipidemia   Hypertension   Incontinence of feces   Urine incontinence   Left ankle pain   Leukocytosis   Loose stools   Neck arthralgia   Joint pain, elbow   Pain, wrist joint   Pelvic floor weakness   Rectal prolapse   Rectocele   Pharyngitis   Spondylolysis, cervical region   Steatosis, liver   Vitamin D deficiency   Urinary frequency   Urge incontinence   OAB (overactive bladder)     Anthropometrics:  Height: 160 cm (5' 2.99\")  Weight: 74.1 kg (163 lb 5.8 oz)  BMI (Calculated): 28.95  Weight History / % Weight Change: 2/11/25 76.4kg, 01/07/25: 76.7 kg, 11/1/24: 77.7kg, 2/06/24: 80.3kg. 3% loss over 3 weeks, 8% loss over 12 months.  Significant Weight Loss: Yes  IBW/kg (Dietitian Calculated): 52.2 kg   Amputation Calculations:  BMI Amputation Adjustment: No    Energy Needs:  Method for Estimating Needs: 9672-7679 @ 25-27 kcal/kg    Method for Estimating 24 Hour Protein Needs: 62-73 @ 1.2-1.4 gr/kg IBW    Total Fluid Estimated Needs in 24 Hours (mL): 1560 mL  Total Fluid " Estimated Needs in 24 hours (mL/kg): 30 mL/kg     Dietary Orders  Adult diet Regular, Fiber restricted  Avoid carbonated beverages.  Oral nutritional supplements  Select supplement: Ensure Clear BID  Select supplement: Nigel BID    Nutrition Focused Physical Findings:  Orbital Fat Pads: Well nourished (slightly bulging fat pads)  Buccal Fat Pads: Well nourished (full, rounded cheeks)    Temporalis: Well nourished (well-defined muscle)  Pectoralis (Clavicular Region): Well nourished (clavicle not visible)  Interosseous: Well nourished (muscle bulges)    Edema: none    Skin: Positive (new ostomy)  Digestive System Findings: Abdominal pain, Anorexia     Labs: 03/05/25 06:14  GLUCOSE: 122 (H)  SODIUM: 138  POTASSIUM: 2.9 (LL)  CHLORIDE: 99  Bicarbonate: 30  Anion Gap: 12  Blood Urea Nitrogen: 9  Creatinine: 0.66  EGFR: >90  Calcium: 8.5 (L)    Nutrition Diagnosis   Malnutrition Diagnosis  Patient has Malnutrition Diagnosis: Yes  Diagnosis Status: New  Malnutrition Diagnosis: Moderate malnutrition related to acute disease or injury  Related to: rectal prolapse  As Evidenced by: intake <75% of estimated needs for > 7days, & weight loss of 3% over 3 weeks    Patient has Nutrition Diagnosis: Yes  Nutrition Diagnosis 1: Predicted inadequate energy intake  Diagnosis Status (1): New  Related to (1): new colostomy placement, POD#1  As Evidenced by (1): intake <50% of meals, poor appetite PTA     Scheduled medications  acetaminophen, 975 mg, oral, q6h CIRO  cloNIDine, 0.1 mg, oral, 4x daily  enoxaparin, 40 mg, subcutaneous, Daily  gabapentin, 100 mg, oral, TID  oxybutynin, 5 mg, oral, BID  pantoprazole, 40 mg, oral, Daily before breakfast  tamsulosin, 0.4 mg, oral, Daily  triamterene-hydrochlorothiazid, 1 tablet, oral, Daily  verapamil SR, 240 mg, oral, Daily    Continuous medications  sodium chloride 0.9%, 40 mL/hr, Last Rate: 40 mL/hr (03/04/25 2300)    PRN medications  PRN medications: methocarbamol, naloxone, ondansetron ODT  **OR** ondansetron, oxyCODONE, oxyCODONE, oxygen       Nutrition Interventions/Recommendations   Nutrition Prescription: Nutrition prescription for oral nutrition  Individualized Nutrition Prescription Provided for : Continue diet as ordered.  Nigel BID, Ensure Clear BID.  MD to manage IVF as indicated.    Food and/or Nutrient Delivery Interventions  Meals and Snacks: Fiber-modified diet  Goal: intake meets >75% estimated nutrient needs.   Medical Food Supplement: Commercial beverage medical food supplement therapy, Commercial food medical food supplement therapy    Nutrition Monitoring and Evaluation   Food and Nutrient Related History   Intake / Amount of food: Consumes at least 50% or more of meals/snacks/supplements, Consumes > or equal to 70% of supplement    Anthropometrics: Body Composition/Growth/Weight History  Body Weight: Body weight - Maintain stable weight    Biochemical Data, Medical Tests and Procedures  Electrolyte and Renal Panel: BUN, Calcium, serum, Chloride, Creatinine, Magnesium, Phosphorus, Potassium, Sodium  Criteria: as indicated  Glucose/Endocrine Profile: Glucose within normal limits ( mg/dL)  Criteria: as indicated  Nutritional Anemia Profile: Folate, serum, Hematocrit, Hemoglobin, Iron, serum, B12  Criteria: as indicated  Vitamin Profile: Vitamin D, 25 hydroxy  Criteria: as indicated    Nutrition Focused Physical Findings   Digestive System Finding: Anorexia, Constipation, Diarrhea, Early satiety, Nausea, Vomiting, Other (Comment)  Criteria: daily     Last Date of Nutrition Visit: 03/05/25  Nutrition Follow-Up Needed?: Dietitian to reassess per policy  Follow up Comment: mod [id number]

## 2025-03-05 NOTE — PERIOPERATIVE NURSING NOTE
Procedure: robotic proctectomy, end colostomy  Allergic to sulfa, h/o anxiety, htn, urinary and fecal incontinence, spine surgery  Anesthesia type (i.e. general, regional, MAC): general  Nerve block (if applicable):  na  Estimated blood loss (EBL) in OR:  50ml  Orientation/mental status: oriented x3, very sleepy  IV site(s), and drips/fluids currently infusing:  right ac #20  lr at 100.  PO status (last oral intake):nothing in pacu  O2 requirements  2lnc  Current pain level  5 med for pain at 2049 with 0.25 dilaudid, then c/o nausea, med with 25mg benadryl for nausea. (Already had zofran in or)  Next antibiotic due @ had ancef at 1807  Last tranexamic acid dose given @  na  Last void/Abebe in place:  has abebe, leave in place tonight  Equipment sent with patient. none  SCDs on (yes/no): yes  Belongings sent with patient:yes  Emergency contact   pt daughter Angela- [phone]  36.0  90  13  99% 163/67  (admit bp today was 166/77)   Mid-Level Procedure Text (B): After obtaining clear surgical margins the patient was sent to a mid-level provider for surgical repair.  The patient understands they will receive post-surgical care and follow-up from the mid-level provider.

## 2025-03-05 NOTE — PROGRESS NOTES
Freda Douglas is a 64 y.o. female on day 1 of admission presenting with Rectal prolapse.      Subjective   TREVER. Pain controlled, has not ate yet. Reports flatus from stoma. Has urge to have BM rectally.     Denies  F/C, CP, SOB, N/V      Objective     PE:  Constitutional: A&Ox3, calm and cooperative, NAD  Eyes: EOMI, clear sclera   Cardiovascular: Normal rate and regular rhythm  Respiratory/Thorax: CTAB, on RA  Gastrointestinal: abd soft, minimally tender, +BS x 4, Ostomy is beefy red, moist, empty pouch with scant blood, no gas.  Genitourinary: Voiding via abebe- clear yellow   Extremities: No peripheral edema  Neurological: A&Ox3, No focal deficits   Psychological: Appropriate mood and behavior      Last Recorded Vitals  Vitals:    03/04/25 2213 03/05/25 0103 03/05/25 0418 03/05/25 0621   BP: 160/82 178/81 171/79 159/78   BP Location: Right arm Left arm Left arm    Patient Position: Lying Lying Lying    Pulse: 100 93 86 91   Resp: 17 18 17    Temp: 36.2 °C (97.2 °F) 36.6 °C (97.8 °F) 37 °C (98.6 °F)    TempSrc: Temporal Temporal Temporal    SpO2: 99% 98% 97%    Weight:       Height:             Relevant Results    Imaging:     .=== 08/19/22 ===    CHEST 2 VIEW    - Impression -  No evidence of acute intrathoracic abnormality.   .=== 01/13/25 ===    CT ABDOMEN PELVIS W IV CONTRAST    - Impression -  1. Status post sacrectomy for giant cell tumor. No recurrent tumor.  2. Constipation.  3. Benign hepatic cysts.    MACRO:  None    Signed by: Effie Leo 1/14/2025 10:06 AM  Dictation workstation:   [id number]         Lab Results:   Lab Results   Component Value Date    WBC 17.8 (H) 03/05/2025    HGB 12.0 03/05/2025    HCT 36.1 03/05/2025    MCV 83 03/05/2025     03/05/2025     Lab Results   Component Value Date    GLUCOSE 122 (H) 03/05/2025    CALCIUM 8.5 (L) 03/05/2025     03/05/2025    K 2.9 (LL) 03/05/2025    CO2 30 03/05/2025    CL 99 03/05/2025    BUN 9 03/05/2025    CREATININE 0.66  "03/05/2025         Estimated Creatinine Clearance: 82 mL/min (by C-G formula based on SCr of 0.66 mg/dL).  No results found for: \"CRP\"      Assessment/Plan   Freda Douglas is a 64 y.o. female on day 1 of admission presenting with Rectal prolapse.    POD1 Laparoscopic Proctectomy, end colostomy  with Dr. 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, rectal prolapse, and severe fecal and urinary incontinence, GERD  -low fiber, soft diet  -PRN Antiemetic   -ERAS: chewing gum, clay BID  -nutrition consult  -enterostomal nurse following   - daily PPI    Neuro: Acute postop pain as expected - well controlled on current medication regimen   Hx: anxiety, awareness under anesthesia  -Continue current pain regimen     CV: BP hypertensive  Hx: HLD, HTN  -Continue to monitor vital signs   - resume home clonidine, maxzide, verapamil   - mIVF 40 ml/hr    Resp: CTAB, on RA  -IS Q 10x/hour while awake   - respiratory care     : Voiding via abebe   - Cr. 0.66  - K 3- will replace  - monitor I and Os  - flomax, oxybutynin  - maintain abebe, consider removal tomorrow with PVRs for every void given complex hx    Heme: H/H 12/36.1  -DVT proph: SCDs/ TEDs/lovenox  - monitor s/sx of active bleeding    Endo: - likely stress reactive  - no hx of issues    ID: Afebrile, wbc 17.8- suspect reactive  -Monitor for s/s of infection       Discussed with Dr. Frias. ADOD in 3-5 pending diet toleration, stoma education      I spent 35 minutes in the professional and overall care of this patient.      Codie Osuna PA-C           "

## 2025-03-06 LAB
ANION GAP SERPL CALC-SCNC: 10 MMOL/L (ref 10–20)
BUN SERPL-MCNC: 18 MG/DL (ref 6–23)
CALCIUM SERPL-MCNC: 8.5 MG/DL (ref 8.6–10.3)
CHLORIDE SERPL-SCNC: 104 MMOL/L (ref 98–107)
CO2 SERPL-SCNC: 27 MMOL/L (ref 21–32)
CREAT SERPL-MCNC: 0.69 MG/DL (ref 0.5–1.05)
EGFRCR SERPLBLD CKD-EPI 2021: >90 ML/MIN/1.73M*2
ERYTHROCYTE [DISTWIDTH] IN BLOOD BY AUTOMATED COUNT: 13.6 % (ref 11.5–14.5)
GLUCOSE SERPL-MCNC: 143 MG/DL (ref 74–99)
HCT VFR BLD AUTO: 31.9 % (ref 36–46)
HGB BLD-MCNC: 10.5 G/DL (ref 12–16)
MCH RBC QN AUTO: 27.7 PG (ref 26–34)
MCHC RBC AUTO-ENTMCNC: 32.9 G/DL (ref 32–36)
MCV RBC AUTO: 84 FL (ref 80–100)
NRBC BLD-RTO: 0 /100 WBCS (ref 0–0)
PLATELET # BLD AUTO: 273 X10*3/UL (ref 150–450)
POTASSIUM SERPL-SCNC: 2.9 MMOL/L (ref 3.5–5.3)
RBC # BLD AUTO: 3.79 X10*6/UL (ref 4–5.2)
SODIUM SERPL-SCNC: 138 MMOL/L (ref 136–145)
WBC # BLD AUTO: 12.5 X10*3/UL (ref 4.4–11.3)

## 2025-03-06 PROCEDURE — 80048 BASIC METABOLIC PNL TOTAL CA: CPT | Performed by: STUDENT IN AN ORGANIZED HEALTH CARE EDUCATION/TRAINING PROGRAM

## 2025-03-06 PROCEDURE — 2500000002 HC RX 250 W HCPCS SELF ADMINISTERED DRUGS (ALT 637 FOR MEDICARE OP, ALT 636 FOR OP/ED): Performed by: COLON & RECTAL SURGERY

## 2025-03-06 PROCEDURE — 99233 SBSQ HOSP IP/OBS HIGH 50: CPT

## 2025-03-06 PROCEDURE — 36415 COLL VENOUS BLD VENIPUNCTURE: CPT | Performed by: STUDENT IN AN ORGANIZED HEALTH CARE EDUCATION/TRAINING PROGRAM

## 2025-03-06 PROCEDURE — 85027 COMPLETE CBC AUTOMATED: CPT | Performed by: STUDENT IN AN ORGANIZED HEALTH CARE EDUCATION/TRAINING PROGRAM

## 2025-03-06 PROCEDURE — 2500000002 HC RX 250 W HCPCS SELF ADMINISTERED DRUGS (ALT 637 FOR MEDICARE OP, ALT 636 FOR OP/ED)

## 2025-03-06 PROCEDURE — 82374 ASSAY BLOOD CARBON DIOXIDE: CPT | Performed by: STUDENT IN AN ORGANIZED HEALTH CARE EDUCATION/TRAINING PROGRAM

## 2025-03-06 PROCEDURE — 2500000001 HC RX 250 WO HCPCS SELF ADMINISTERED DRUGS (ALT 637 FOR MEDICARE OP): Performed by: STUDENT IN AN ORGANIZED HEALTH CARE EDUCATION/TRAINING PROGRAM

## 2025-03-06 PROCEDURE — 2500000002 HC RX 250 W HCPCS SELF ADMINISTERED DRUGS (ALT 637 FOR MEDICARE OP, ALT 636 FOR OP/ED): Performed by: STUDENT IN AN ORGANIZED HEALTH CARE EDUCATION/TRAINING PROGRAM

## 2025-03-06 PROCEDURE — 1100000001 HC PRIVATE ROOM DAILY

## 2025-03-06 PROCEDURE — 2500000004 HC RX 250 GENERAL PHARMACY W/ HCPCS (ALT 636 FOR OP/ED): Performed by: STUDENT IN AN ORGANIZED HEALTH CARE EDUCATION/TRAINING PROGRAM

## 2025-03-06 RX ORDER — POTASSIUM CHLORIDE 20 MEQ/1
60 TABLET, EXTENDED RELEASE ORAL ONCE
Status: COMPLETED | OUTPATIENT
Start: 2025-03-06 | End: 2025-03-06

## 2025-03-06 RX ADMIN — METHOCARBAMOL 500 MG: 500 TABLET ORAL at 21:04

## 2025-03-06 RX ADMIN — OXYCODONE HYDROCHLORIDE 10 MG: 5 TABLET ORAL at 04:56

## 2025-03-06 RX ADMIN — GABAPENTIN 100 MG: 100 CAPSULE ORAL at 14:42

## 2025-03-06 RX ADMIN — OXYCODONE HYDROCHLORIDE 10 MG: 5 TABLET ORAL at 14:42

## 2025-03-06 RX ADMIN — ACETAMINOPHEN 975 MG: 325 TABLET, FILM COATED ORAL at 18:05

## 2025-03-06 RX ADMIN — OXYCODONE HYDROCHLORIDE 5 MG: 5 TABLET ORAL at 10:02

## 2025-03-06 RX ADMIN — PANTOPRAZOLE SODIUM 40 MG: 40 TABLET, DELAYED RELEASE ORAL at 07:05

## 2025-03-06 RX ADMIN — ACETAMINOPHEN 975 MG: 325 TABLET, FILM COATED ORAL at 13:01

## 2025-03-06 RX ADMIN — OXYBUTYNIN CHLORIDE 5 MG: 5 TABLET ORAL at 10:03

## 2025-03-06 RX ADMIN — POTASSIUM CHLORIDE 60 MEQ: 1500 TABLET, EXTENDED RELEASE ORAL at 10:02

## 2025-03-06 RX ADMIN — ACETAMINOPHEN 975 MG: 325 TABLET, FILM COATED ORAL at 07:05

## 2025-03-06 RX ADMIN — GABAPENTIN 100 MG: 100 CAPSULE ORAL at 10:03

## 2025-03-06 RX ADMIN — VERAPAMIL HYDROCHLORIDE 240 MG: 120 TABLET ORAL at 10:20

## 2025-03-06 RX ADMIN — ENOXAPARIN SODIUM 40 MG: 40 INJECTION SUBCUTANEOUS at 10:02

## 2025-03-06 RX ADMIN — OXYBUTYNIN CHLORIDE 5 MG: 5 TABLET ORAL at 21:04

## 2025-03-06 RX ADMIN — CLONIDINE HYDROCHLORIDE 0.1 MG: 0.1 TABLET ORAL at 05:02

## 2025-03-06 RX ADMIN — CLONIDINE HYDROCHLORIDE 0.1 MG: 0.1 TABLET ORAL at 21:04

## 2025-03-06 RX ADMIN — ACETAMINOPHEN 975 MG: 325 TABLET, FILM COATED ORAL at 23:14

## 2025-03-06 RX ADMIN — OXYCODONE HYDROCHLORIDE 5 MG: 5 TABLET ORAL at 18:46

## 2025-03-06 RX ADMIN — TAMSULOSIN HYDROCHLORIDE 0.4 MG: 0.4 CAPSULE ORAL at 10:03

## 2025-03-06 RX ADMIN — CLONIDINE HYDROCHLORIDE 0.1 MG: 0.1 TABLET ORAL at 18:05

## 2025-03-06 RX ADMIN — GABAPENTIN 100 MG: 100 CAPSULE ORAL at 21:04

## 2025-03-06 SDOH — ECONOMIC STABILITY: HOUSING INSECURITY: IN THE LAST 12 MONTHS, WAS THERE A TIME WHEN YOU WERE NOT ABLE TO PAY THE MORTGAGE OR RENT ON TIME?: NO

## 2025-03-06 SDOH — SOCIAL STABILITY: SOCIAL INSECURITY: HAVE YOU HAD THOUGHTS OF HARMING ANYONE ELSE?: NO

## 2025-03-06 SDOH — SOCIAL STABILITY: SOCIAL INSECURITY: HAS ANYONE EVER THREATENED TO HURT YOUR FAMILY OR YOUR PETS?: NO

## 2025-03-06 SDOH — SOCIAL STABILITY: SOCIAL INSECURITY
WITHIN THE LAST YEAR, HAVE YOU BEEN RAPED OR FORCED TO HAVE ANY KIND OF SEXUAL ACTIVITY BY YOUR PARTNER OR EX-PARTNER?: NO

## 2025-03-06 SDOH — ECONOMIC STABILITY: FOOD INSECURITY: WITHIN THE PAST 12 MONTHS, YOU WORRIED THAT YOUR FOOD WOULD RUN OUT BEFORE YOU GOT THE MONEY TO BUY MORE.: NEVER TRUE

## 2025-03-06 SDOH — SOCIAL STABILITY: SOCIAL INSECURITY: DO YOU FEEL UNSAFE GOING BACK TO THE PLACE WHERE YOU ARE LIVING?: NO

## 2025-03-06 SDOH — ECONOMIC STABILITY: FOOD INSECURITY: WITHIN THE PAST 12 MONTHS, THE FOOD YOU BOUGHT JUST DIDN'T LAST AND YOU DIDN'T HAVE MONEY TO GET MORE.: NEVER TRUE

## 2025-03-06 SDOH — SOCIAL STABILITY: SOCIAL INSECURITY
WITHIN THE LAST YEAR, HAVE YOU BEEN KICKED, HIT, SLAPPED, OR OTHERWISE PHYSICALLY HURT BY YOUR PARTNER OR EX-PARTNER?: NO

## 2025-03-06 SDOH — ECONOMIC STABILITY: HOUSING INSECURITY: AT ANY TIME IN THE PAST 12 MONTHS, WERE YOU HOMELESS OR LIVING IN A SHELTER (INCLUDING NOW)?: NO

## 2025-03-06 SDOH — SOCIAL STABILITY: SOCIAL INSECURITY: WITHIN THE LAST YEAR, HAVE YOU BEEN AFRAID OF YOUR PARTNER OR EX-PARTNER?: NO

## 2025-03-06 SDOH — SOCIAL STABILITY: SOCIAL INSECURITY: ARE THERE ANY APPARENT SIGNS OF INJURIES/BEHAVIORS THAT COULD BE RELATED TO ABUSE/NEGLECT?: NO

## 2025-03-06 SDOH — SOCIAL STABILITY: SOCIAL INSECURITY: ABUSE: ADULT

## 2025-03-06 SDOH — ECONOMIC STABILITY: TRANSPORTATION INSECURITY: IN THE PAST 12 MONTHS, HAS LACK OF TRANSPORTATION KEPT YOU FROM MEDICAL APPOINTMENTS OR FROM GETTING MEDICATIONS?: NO

## 2025-03-06 SDOH — SOCIAL STABILITY: SOCIAL INSECURITY: WITHIN THE LAST YEAR, HAVE YOU BEEN HUMILIATED OR EMOTIONALLY ABUSED IN OTHER WAYS BY YOUR PARTNER OR EX-PARTNER?: NO

## 2025-03-06 SDOH — ECONOMIC STABILITY: FOOD INSECURITY: HOW HARD IS IT FOR YOU TO PAY FOR THE VERY BASICS LIKE FOOD, HOUSING, MEDICAL CARE, AND HEATING?: NOT HARD AT ALL

## 2025-03-06 SDOH — SOCIAL STABILITY: SOCIAL INSECURITY: ARE YOU OR HAVE YOU BEEN THREATENED OR ABUSED PHYSICALLY, EMOTIONALLY, OR SEXUALLY BY ANYONE?: NO

## 2025-03-06 SDOH — SOCIAL STABILITY: SOCIAL INSECURITY: DOES ANYONE TRY TO KEEP YOU FROM HAVING/CONTACTING OTHER FRIENDS OR DOING THINGS OUTSIDE YOUR HOME?: NO

## 2025-03-06 SDOH — SOCIAL STABILITY: SOCIAL INSECURITY: WERE YOU ABLE TO COMPLETE ALL THE BEHAVIORAL HEALTH SCREENINGS?: YES

## 2025-03-06 SDOH — ECONOMIC STABILITY: HOUSING INSECURITY: IN THE PAST 12 MONTHS, HOW MANY TIMES HAVE YOU MOVED WHERE YOU WERE LIVING?: 0

## 2025-03-06 SDOH — ECONOMIC STABILITY: INCOME INSECURITY: IN THE PAST 12 MONTHS HAS THE ELECTRIC, GAS, OIL, OR WATER COMPANY THREATENED TO SHUT OFF SERVICES IN YOUR HOME?: NO

## 2025-03-06 SDOH — SOCIAL STABILITY: SOCIAL INSECURITY: DO YOU FEEL ANYONE HAS EXPLOITED OR TAKEN ADVANTAGE OF YOU FINANCIALLY OR OF YOUR PERSONAL PROPERTY?: NO

## 2025-03-06 SDOH — SOCIAL STABILITY: SOCIAL INSECURITY
ASK PARENT OR GUARDIAN: ARE THERE TIMES WHEN YOU, YOUR CHILD(REN), OR ANY MEMBER OF YOUR HOUSEHOLD FEEL UNSAFE, HARMED, OR THREATENED AROUND PERSONS WITH WHOM YOU KNOW OR LIVE?: NO

## 2025-03-06 ASSESSMENT — COGNITIVE AND FUNCTIONAL STATUS - GENERAL
DAILY ACTIVITIY SCORE: 24
MOBILITY SCORE: 24
MOBILITY SCORE: 24
DAILY ACTIVITIY SCORE: 24
MOBILITY SCORE: 24
DAILY ACTIVITIY SCORE: 24

## 2025-03-06 ASSESSMENT — PAIN SCALES - GENERAL
PAINLEVEL_OUTOF10: 7
PAINLEVEL_OUTOF10: 4
PAINLEVEL_OUTOF10: 3
PAINLEVEL_OUTOF10: 5 - MODERATE PAIN
PAINLEVEL_OUTOF10: 6
PAINLEVEL_OUTOF10: 4
PAINLEVEL_OUTOF10: 5 - MODERATE PAIN
PAINLEVEL_OUTOF10: 7
PAINLEVEL_OUTOF10: 5 - MODERATE PAIN
PAINLEVEL_OUTOF10: 5 - MODERATE PAIN
PAINLEVEL_OUTOF10: 3

## 2025-03-06 ASSESSMENT — PAIN DESCRIPTION - LOCATION
LOCATION: ABDOMEN

## 2025-03-06 ASSESSMENT — PATIENT HEALTH QUESTIONNAIRE - PHQ9
1. LITTLE INTEREST OR PLEASURE IN DOING THINGS: NOT AT ALL
2. FEELING DOWN, DEPRESSED OR HOPELESS: NOT AT ALL
SUM OF ALL RESPONSES TO PHQ9 QUESTIONS 1 & 2: 0

## 2025-03-06 ASSESSMENT — PAIN DESCRIPTION - ORIENTATION
ORIENTATION: LEFT

## 2025-03-06 ASSESSMENT — PAIN - FUNCTIONAL ASSESSMENT
PAIN_FUNCTIONAL_ASSESSMENT: 0-10

## 2025-03-06 ASSESSMENT — LIFESTYLE VARIABLES
HOW OFTEN DO YOU HAVE 6 OR MORE DRINKS ON ONE OCCASION: NEVER
HOW MANY STANDARD DRINKS CONTAINING ALCOHOL DO YOU HAVE ON A TYPICAL DAY: 1 OR 2
HOW OFTEN DO YOU HAVE A DRINK CONTAINING ALCOHOL: MONTHLY OR LESS
AUDIT-C TOTAL SCORE: 1
SKIP TO QUESTIONS 9-10: 1
AUDIT-C TOTAL SCORE: 1

## 2025-03-06 ASSESSMENT — ACTIVITIES OF DAILY LIVING (ADL): LACK_OF_TRANSPORTATION: NO

## 2025-03-06 NOTE — PROGRESS NOTES
"Freda Douglas is a 64 y.o. female on day 2 of admission presenting with Rectal prolapse.    Subjective   Soft BP overnight- , received 500 ml bolus.  Abdomen is sore but pain is tolerable. Tolerating diet- not much of an appetite. Denies nausea/vomiting.        Objective     PE:  Constitutional: calm and cooperative, NAD  Eyes: PERRL, clear sclera   ENMT: Moist mucous membranes  Cardiovascular: RRR. 2+ equal pulses of the distal extremities.  Respiratory/Thorax: CTAB. Good symmetric chest expansion. On RA  Gastrointestinal: Abdomen slightly distended, soft, appropriately tender, no peritoneal signs. Stoma beefy red, moist, producing loose brown stool, and gas (75 ml/24 hrs documented). Rectum well approximated with scant clear drainage   Genitourinary: abebe in place draining clear yellow urine   Musculoskeletal: ROM intact, no joint swelling, normal strength  Extremities: No peripheral edema  Neurological: A&Ox3, No focal deficits   Psychological: Appropriate mood and behavior  Skin: Warm and dry      Last Recorded Vitals  Blood pressure 119/52, pulse 101, temperature 37.2 °C (99 °F), temperature source Temporal, resp. rate 18, height 1.6 m (5' 2.99\"), weight 74.1 kg (163 lb 5.8 oz), SpO2 94%.  Intake/Output last 3 Shifts:  I/O last 3 completed shifts:  In: 1867 (25.2 mL/kg) [I.V.:1058.7 (14.3 mL/kg); IV Piggyback:808.3]  Out: 2125 (28.7 mL/kg) [Urine:2000 (0.7 mL/kg/hr); Stool:75; Blood:50]  Weight: 74.1 kg     Relevant Results  Scheduled medications  acetaminophen, 975 mg, oral, q6h CIRO  cloNIDine, 0.1 mg, oral, 4x daily  enoxaparin, 40 mg, subcutaneous, Daily  gabapentin, 100 mg, oral, TID  oxybutynin, 5 mg, oral, BID  pantoprazole, 40 mg, oral, Daily before breakfast  tamsulosin, 0.4 mg, oral, Daily  triamterene-hydrochlorothiazid, 1 tablet, oral, Daily  verapamil SR, 240 mg, oral, Daily      Continuous medications     PRN medications  PRN medications: methocarbamol, naloxone, ondansetron ODT **OR** " ondansetron, oxyCODONE, oxyCODONE, oxygen  Results for orders placed or performed during the hospital encounter of 03/04/25 (from the past 24 hours)   Basic Metabolic Panel   Result Value Ref Range    Glucose 143 (H) 74 - 99 mg/dL    Sodium 138 136 - 145 mmol/L    Potassium 2.9 (LL) 3.5 - 5.3 mmol/L    Chloride 104 98 - 107 mmol/L    Bicarbonate 27 21 - 32 mmol/L    Anion Gap 10 10 - 20 mmol/L    Urea Nitrogen 18 6 - 23 mg/dL    Creatinine 0.69 0.50 - 1.05 mg/dL    eGFR >90 >60 mL/min/1.73m*2    Calcium 8.5 (L) 8.6 - 10.3 mg/dL   CBC   Result Value Ref Range    WBC 12.5 (H) 4.4 - 11.3 x10*3/uL    nRBC 0.0 0.0 - 0.0 /100 WBCs    RBC 3.79 (L) 4.00 - 5.20 x10*6/uL    Hemoglobin 10.5 (L) 12.0 - 16.0 g/dL    Hematocrit 31.9 (L) 36.0 - 46.0 %    MCV 84 80 - 100 fL    MCH 27.7 26.0 - 34.0 pg    MCHC 32.9 32.0 - 36.0 g/dL    RDW 13.6 11.5 - 14.5 %    Platelets 273 150 - 450 x10*3/uL           Malnutrition Diagnosis Status: New  Malnutrition Diagnosis: Moderate malnutrition related to acute disease or injury  Related to: rectal prolapse  As Evidenced by: intake <75% of estimated needs for > 7days, & weight loss of 3% over 3 weeks  I agree with the dietitian's malnutrition diagnosis.      Assessment/Plan   Assessment & Plan  Rectal prolapse    Freda Douglas is a 64 y.o. female on day 2 of admission presenting with rectal prolapse and fecal incontinence now s/p laparoscopic proctectomy with end colostomy.     Neuro: acute post op pain well controlled   Hx: anxiety, awareness under anesthesia   - scheduled tylenol and gabapentin  - PRN oxycodone   - OOB/ ambulate 5x per day      CV: VSS, RRR. -135; HR   Hx: HTN, HLD  - bolus given overnight for soft BP ()  - home clonidine, Maxzide, and verapamil  - home atorvastatin   - VS every 4 hours     Pulm: CTAB, on RA  - IS every hour while awake   - Pulse ox every 4 hours with VS     GI: Abdomen slightly distended, soft, appropriately tender, no peritoneal  signs. Stoma beefy red, moist, producing loose brown stool, and gas (75 ml/24 hrs documented). Rectum well approximated with scant clear drainage.   Hx: Giant cell tumor of sacrum and hip S/P radiation and sacrectomy c/b no anal tone, rectal prolapse, and severe fecal and urinary incontinence, GERD   - regular diet  - Nigel supplements with meals  - dietician following  - enterostomal RN following   - PRN antiemetic   - PPI daily  - chewing gum    : Abebe in place, adequate OP  Hx: urinary incontinence   - creatinine 0.69  - potassium 2.9-- 60 meq PO given for replacement   - Monitor I&Os   - abebe removal today-- TOV  - flomax daily   - home oxybutynin     HEME: H&H 10.5/31.9  - DVT Proph: SCDs/ ambulate/ lovenox    Endo: No acute issues  - serum glucose 143    ID: Afebrile, WBC 12.5   - H&H 10.5/31.9   - Monitor for s/s infection    Dispo: Home today vs tomorrow after TOV.        I spent 40 minutes in the professional and overall care of this patient.      Kristi Dejesus, APRN-CNP

## 2025-03-06 NOTE — CARE PLAN
The patient's goals for the shift include to remain safe and comfortable    The clinical goals for the shift include maintain safety    Over the shift, the patient did not make progress toward the following goals. Barriers to progression include post surgical. Recommendations to address these barriers include pain management.

## 2025-03-06 NOTE — CARE PLAN
The patient's goals for the shift include      The clinical goals for the shift include pt remains safe and comfortable throughout shift    Problem: Pain - Adult  Goal: Verbalizes/displays adequate comfort level or baseline comfort level  Outcome: Progressing     Problem: Safety - Adult  Goal: Free from fall injury  Outcome: Progressing     Problem: Discharge Planning  Goal: Discharge to home or other facility with appropriate resources  Outcome: Progressing     Problem: Chronic Conditions and Co-morbidities  Goal: Patient's chronic conditions and co-morbidity symptoms are monitored and maintained or improved  Outcome: Progressing     Problem: Nutrition  Goal: Nutrient intake appropriate for maintaining nutritional needs  Outcome: Progressing     Problem: Pain  Goal: Takes deep breaths with improved pain control throughout the shift  Outcome: Progressing  Goal: Turns in bed with improved pain control throughout the shift  Outcome: Progressing  Goal: Walks with improved pain control throughout the shift  Outcome: Progressing  Goal: Performs ADL's with improved pain control throughout shift  Outcome: Progressing  Goal: Participates in PT with improved pain control throughout the shift  Outcome: Progressing  Goal: Free from opioid side effects throughout the shift  Outcome: Progressing  Goal: Free from acute confusion related to pain meds throughout the shift  Outcome: Progressing

## 2025-03-06 NOTE — CONSULTS
Ostomy Wound Care Consult     Visit Date: 3/6/2025      Patient Name: Freda Douglas         MRN: [id number]           YOB: 1960     WOC nursing visit outcome: Patient able to complete pouch change with step by step guidances and picture guide.       WOC next scheduled visit/plan: WOC will follow while inpatient.     Stoma Type: End Colostomy  Timmy: No  Diameter: 1 1/2  Location: LUQ  Protrusion: Budded  Mucosal Condition and Color: Moist, Red  Mucocutaneous Junction: Intact  Peristomal Skin: Clear, intact  Location of Skin Impairment: N/A  Peristomal Contour: Flat  Supportive Tissue: Semi-Soft  Character of Output: Brown and Thick/Mushy Liquid  Emptying Frequency: as needed   Removed/Current Pouching System: Applied barrier ring, 2 1/4 soft convex wafer with lock and roll pouch.     Recommendations:   Skin Care: apply stoma powder as needed          Paty Holloway RN BSN,[facility],CWOCN  [phone]   3/6/2025  6:59 PM

## 2025-03-07 LAB
ANION GAP SERPL CALC-SCNC: 10 MMOL/L (ref 10–20)
APPEARANCE UR: CLEAR
BILIRUB UR STRIP.AUTO-MCNC: NEGATIVE MG/DL
BUN SERPL-MCNC: 16 MG/DL (ref 6–23)
CALCIUM SERPL-MCNC: 8.7 MG/DL (ref 8.6–10.3)
CHLORIDE SERPL-SCNC: 103 MMOL/L (ref 98–107)
CO2 SERPL-SCNC: 27 MMOL/L (ref 21–32)
COLOR UR: ABNORMAL
CREAT SERPL-MCNC: 0.59 MG/DL (ref 0.5–1.05)
EGFRCR SERPLBLD CKD-EPI 2021: >90 ML/MIN/1.73M*2
ERYTHROCYTE [DISTWIDTH] IN BLOOD BY AUTOMATED COUNT: 13.6 % (ref 11.5–14.5)
GLUCOSE SERPL-MCNC: 99 MG/DL (ref 74–99)
GLUCOSE UR STRIP.AUTO-MCNC: NORMAL MG/DL
HCT VFR BLD AUTO: 32.6 % (ref 36–46)
HGB BLD-MCNC: 11 G/DL (ref 12–16)
KETONES UR STRIP.AUTO-MCNC: NEGATIVE MG/DL
LEUKOCYTE ESTERASE UR QL STRIP.AUTO: ABNORMAL
MCH RBC QN AUTO: 28.3 PG (ref 26–34)
MCHC RBC AUTO-ENTMCNC: 33.7 G/DL (ref 32–36)
MCV RBC AUTO: 84 FL (ref 80–100)
MUCOUS THREADS #/AREA URNS AUTO: ABNORMAL /LPF
NITRITE UR QL STRIP.AUTO: NEGATIVE
NRBC BLD-RTO: 0 /100 WBCS (ref 0–0)
PH UR STRIP.AUTO: 6.5 [PH]
PLATELET # BLD AUTO: 302 X10*3/UL (ref 150–450)
POTASSIUM SERPL-SCNC: 3.5 MMOL/L (ref 3.5–5.3)
PROT UR STRIP.AUTO-MCNC: ABNORMAL MG/DL
RBC # BLD AUTO: 3.89 X10*6/UL (ref 4–5.2)
RBC # UR STRIP.AUTO: ABNORMAL MG/DL
RBC #/AREA URNS AUTO: ABNORMAL /HPF
SODIUM SERPL-SCNC: 136 MMOL/L (ref 136–145)
SP GR UR STRIP.AUTO: 1.02
SQUAMOUS #/AREA URNS AUTO: ABNORMAL /HPF
UROBILINOGEN UR STRIP.AUTO-MCNC: NORMAL MG/DL
WBC # BLD AUTO: 13.1 X10*3/UL (ref 4.4–11.3)
WBC #/AREA URNS AUTO: ABNORMAL /HPF

## 2025-03-07 PROCEDURE — 99233 SBSQ HOSP IP/OBS HIGH 50: CPT

## 2025-03-07 PROCEDURE — 87086 URINE CULTURE/COLONY COUNT: CPT | Mod: AHULAB

## 2025-03-07 PROCEDURE — 82374 ASSAY BLOOD CARBON DIOXIDE: CPT | Performed by: STUDENT IN AN ORGANIZED HEALTH CARE EDUCATION/TRAINING PROGRAM

## 2025-03-07 PROCEDURE — 2500000004 HC RX 250 GENERAL PHARMACY W/ HCPCS (ALT 636 FOR OP/ED): Performed by: STUDENT IN AN ORGANIZED HEALTH CARE EDUCATION/TRAINING PROGRAM

## 2025-03-07 PROCEDURE — 2500000004 HC RX 250 GENERAL PHARMACY W/ HCPCS (ALT 636 FOR OP/ED)

## 2025-03-07 PROCEDURE — 2500000002 HC RX 250 W HCPCS SELF ADMINISTERED DRUGS (ALT 637 FOR MEDICARE OP, ALT 636 FOR OP/ED): Performed by: COLON & RECTAL SURGERY

## 2025-03-07 PROCEDURE — 2500000001 HC RX 250 WO HCPCS SELF ADMINISTERED DRUGS (ALT 637 FOR MEDICARE OP): Performed by: STUDENT IN AN ORGANIZED HEALTH CARE EDUCATION/TRAINING PROGRAM

## 2025-03-07 PROCEDURE — 2500000001 HC RX 250 WO HCPCS SELF ADMINISTERED DRUGS (ALT 637 FOR MEDICARE OP)

## 2025-03-07 PROCEDURE — 81001 URINALYSIS AUTO W/SCOPE: CPT

## 2025-03-07 PROCEDURE — 2500000002 HC RX 250 W HCPCS SELF ADMINISTERED DRUGS (ALT 637 FOR MEDICARE OP, ALT 636 FOR OP/ED): Performed by: STUDENT IN AN ORGANIZED HEALTH CARE EDUCATION/TRAINING PROGRAM

## 2025-03-07 PROCEDURE — 1100000001 HC PRIVATE ROOM DAILY

## 2025-03-07 PROCEDURE — 80048 BASIC METABOLIC PNL TOTAL CA: CPT | Performed by: STUDENT IN AN ORGANIZED HEALTH CARE EDUCATION/TRAINING PROGRAM

## 2025-03-07 PROCEDURE — 85027 COMPLETE CBC AUTOMATED: CPT | Performed by: STUDENT IN AN ORGANIZED HEALTH CARE EDUCATION/TRAINING PROGRAM

## 2025-03-07 PROCEDURE — 36415 COLL VENOUS BLD VENIPUNCTURE: CPT | Performed by: STUDENT IN AN ORGANIZED HEALTH CARE EDUCATION/TRAINING PROGRAM

## 2025-03-07 PROCEDURE — 2500000005 HC RX 250 GENERAL PHARMACY W/O HCPCS

## 2025-03-07 RX ORDER — KETOROLAC TROMETHAMINE 30 MG/ML
15 INJECTION, SOLUTION INTRAMUSCULAR; INTRAVENOUS EVERY 6 HOURS
Status: COMPLETED | OUTPATIENT
Start: 2025-03-07 | End: 2025-03-08

## 2025-03-07 RX ORDER — ADHESIVE BANDAGE
45 BANDAGE TOPICAL ONCE
Status: COMPLETED | OUTPATIENT
Start: 2025-03-07 | End: 2025-03-07

## 2025-03-07 RX ORDER — ADHESIVE BANDAGE
30 BANDAGE TOPICAL DAILY PRN
Status: DISCONTINUED | OUTPATIENT
Start: 2025-03-07 | End: 2025-03-07

## 2025-03-07 RX ORDER — LIDOCAINE 560 MG/1
1 PATCH PERCUTANEOUS; TOPICAL; TRANSDERMAL DAILY
Status: DISCONTINUED | OUTPATIENT
Start: 2025-03-07 | End: 2025-03-09 | Stop reason: HOSPADM

## 2025-03-07 RX ORDER — METHOCARBAMOL 100 MG/ML
500 INJECTION, SOLUTION INTRAMUSCULAR; INTRAVENOUS EVERY 8 HOURS PRN
Status: DISCONTINUED | OUTPATIENT
Start: 2025-03-07 | End: 2025-03-09 | Stop reason: HOSPADM

## 2025-03-07 RX ADMIN — OXYBUTYNIN CHLORIDE 5 MG: 5 TABLET ORAL at 10:28

## 2025-03-07 RX ADMIN — OXYCODONE HYDROCHLORIDE 5 MG: 5 TABLET ORAL at 10:28

## 2025-03-07 RX ADMIN — TAMSULOSIN HYDROCHLORIDE 0.4 MG: 0.4 CAPSULE ORAL at 10:28

## 2025-03-07 RX ADMIN — VERAPAMIL HYDROCHLORIDE 240 MG: 120 TABLET ORAL at 10:28

## 2025-03-07 RX ADMIN — GABAPENTIN 100 MG: 100 CAPSULE ORAL at 14:51

## 2025-03-07 RX ADMIN — KETOROLAC TROMETHAMINE 15 MG: 30 INJECTION, SOLUTION INTRAMUSCULAR at 21:05

## 2025-03-07 RX ADMIN — ENOXAPARIN SODIUM 40 MG: 40 INJECTION SUBCUTANEOUS at 10:27

## 2025-03-07 RX ADMIN — MAGNESIUM HYDROXIDE 45 ML: 400 SUSPENSION ORAL at 10:30

## 2025-03-07 RX ADMIN — ACETAMINOPHEN 975 MG: 325 TABLET, FILM COATED ORAL at 23:39

## 2025-03-07 RX ADMIN — PANTOPRAZOLE SODIUM 40 MG: 40 TABLET, DELAYED RELEASE ORAL at 05:03

## 2025-03-07 RX ADMIN — ACETAMINOPHEN 975 MG: 325 TABLET, FILM COATED ORAL at 18:29

## 2025-03-07 RX ADMIN — OXYBUTYNIN CHLORIDE 5 MG: 5 TABLET ORAL at 21:05

## 2025-03-07 RX ADMIN — TRIAMTERENE AND HYDROCHLOROTHIAZIDE 1 TABLET: 37.5; 25 TABLET ORAL at 10:29

## 2025-03-07 RX ADMIN — CLONIDINE HYDROCHLORIDE 0.1 MG: 0.1 TABLET ORAL at 13:02

## 2025-03-07 RX ADMIN — GABAPENTIN 100 MG: 100 CAPSULE ORAL at 21:04

## 2025-03-07 RX ADMIN — KETOROLAC TROMETHAMINE 15 MG: 30 INJECTION, SOLUTION INTRAMUSCULAR at 14:51

## 2025-03-07 RX ADMIN — LIDOCAINE 4% 1 PATCH: 40 PATCH TOPICAL at 10:28

## 2025-03-07 RX ADMIN — GABAPENTIN 100 MG: 100 CAPSULE ORAL at 10:28

## 2025-03-07 RX ADMIN — ACETAMINOPHEN 975 MG: 325 TABLET, FILM COATED ORAL at 13:02

## 2025-03-07 RX ADMIN — CLONIDINE HYDROCHLORIDE 0.1 MG: 0.1 TABLET ORAL at 21:05

## 2025-03-07 RX ADMIN — METHOCARBAMOL 500 MG: 500 TABLET ORAL at 05:07

## 2025-03-07 ASSESSMENT — PAIN - FUNCTIONAL ASSESSMENT
PAIN_FUNCTIONAL_ASSESSMENT: 0-10

## 2025-03-07 ASSESSMENT — COGNITIVE AND FUNCTIONAL STATUS - GENERAL
DRESSING REGULAR LOWER BODY CLOTHING: A LITTLE
CLIMB 3 TO 5 STEPS WITH RAILING: A LITTLE
MOBILITY SCORE: 23
MOBILITY SCORE: 23
DAILY ACTIVITIY SCORE: 23
DRESSING REGULAR LOWER BODY CLOTHING: A LITTLE
CLIMB 3 TO 5 STEPS WITH RAILING: A LITTLE
DAILY ACTIVITIY SCORE: 23

## 2025-03-07 ASSESSMENT — PAIN SCALES - GENERAL
PAINLEVEL_OUTOF10: 5 - MODERATE PAIN
PAINLEVEL_OUTOF10: 3
PAINLEVEL_OUTOF10: 6
PAINLEVEL_OUTOF10: 6
PAINLEVEL_OUTOF10: 5 - MODERATE PAIN
PAINLEVEL_OUTOF10: 2
PAINLEVEL_OUTOF10: 1

## 2025-03-07 ASSESSMENT — PAIN DESCRIPTION - LOCATION: LOCATION: ABDOMEN

## 2025-03-07 ASSESSMENT — PAIN DESCRIPTION - DESCRIPTORS: DESCRIPTORS: SORE

## 2025-03-07 ASSESSMENT — PAIN DESCRIPTION - ORIENTATION: ORIENTATION: LEFT

## 2025-03-07 NOTE — PROGRESS NOTES
03/07/25 1134   Discharge Planning   Who is requesting discharge planning? Provider   Home or Post Acute Services In home services   Type of Home Care Services Home nursing visits   Expected Discharge Disposition Home H   Does the patient need discharge transport arranged? No   Intensity of Service   Intensity of Service 0-30 min     3/7/25 1136  Waiting on RBF.  Ostomy teaching done.  Plan is home with [facility] for  for ostomy care at discharge.  Louann Ott RN TCC

## 2025-03-07 NOTE — NURSING NOTE
"Ostomy/Wound Care Consult     Visit Date: 3/7/2025      Patient Name: Freda Douglas         MRN: [id number]           YOB: 1960       [facility] nursing visit outcome: Asked by Kristi Dejesus CNP to intubate and flush stoma.  Colostomy was fairly easily intubated and flushed with 700 ml normal saline.  Initially only slightly brownish stained liquid drained back.  During the visit for lesson and teaching, thicker liquid brown drain from catheter and around the catheter, total of 500cc. Drained back.  Her left in stoma and secured in place.     Pt completed hands on lesson and did very good.  All questions addressed.  Has homegoing supplies, instructions and supply list at bedside.  Is ready for weekend discharge with HH from stoma standpoint.    [facility] next scheduled visit/plan: will follow while inpatient    Stoma Type: End Colostomy  Diameter: 1 1/2\"  Location: LUQ  Protrusion: Budded  Mucosal Condition and Color: Moist, Red  Mucocutaneous Junction: Intact  Peristomal Skin: Clear, intact  Location of Skin Impairment: n/a  Peristomal Contour: Flat  Supportive Tissue: Semi-Soft  Character of Output:  no stool or gas when first entered room, after intubation and flushing, some liquid brown stool in pouch  Emptying Frequency: per floor  Removed/Current Pouching System: 2 1/4\" [place] New Image soft convex wafer, flat Adapt Cera ring and drainable pouch  Current Wearing Time: 1 day, excellent seal    Recommendations:   Skin Care: educated how/when to use stoma powder  Pouching System: same  Wear Time: goal is 3-4 Days    Comments: samples of [place] soft convex, closed end and drainable pouches and Coloplast 2-piece soft convex with closed and drainable options ordered for pt.  Also plan to request samples of Convatec Moldable flat with both drainable and closed end options.      Time Increment: 90 minutes    Vinita OCAMPON, RN, CWOCN  3/7/2025  4:32 PM        "

## 2025-03-07 NOTE — NURSING NOTE
[facility]  [facility] [place] [address]   Phone: [phone]  Fax: [phone]     Ostomy Supply List  PATIENT NAME:  Freda Douglas                                                                   DATE:  2025     [address]:  [address]      C[facility]:  [facility]   STATE:  [place]                                            ZIP:  [id number]     PHONE: [phone]    :  1960     DIAGNOSIS:  Rectal Prolapse          STOMA TYPE:  End colostomy     Item Order # Brand Description Qty/Unit 30 Day Use   Wafer   61517 Sag[place]ore 2 ¼” New Image soft convex wafer 5/Box  4 Boxes   Pouch-drainable 18003-transparent    73449-jxhns Sagamore 2 ¼” New Image drainable pouch 10/Box 2 Boxes   Pouch-closed end 18363-transparent    18798-hbmto Geronimo 2 ¼” New Image closed end pouch with filter 60/Box 1 Box   Stoma Powder   7906 Geronimo Adapt Powder 1 Bottle As needed   Barrier Ring   8805 Sag[place]ore Adapt Cera Ring 10/Box  2 Boxes   Adhesive Removers 7760   Geronimo Adapt Universal Remover Wipes 50/Box 1 Box   Belt   7299 Sagamore  Adapt belt 1 Belt 1 Belt     Refill #: [id number]      Attending Physician:  Dr. Frias   Office Phone: [phone]   Office Fax: [phone]     [facility] RN: Vinita WILKINSON, RN, CWOCN             Office Phone: [phone]     Notes:

## 2025-03-07 NOTE — PROGRESS NOTES
Pharmacy Medication History     Source of Information: patient    Additional concerns with the patient's PTA list.   N/a  The following updates were made to the Prior to Admission medication list:     Medications ADDED:   N/a  Medications CHANGED:  N/a  Medications REMOVED:   N/a  Medications NOT TAKING:   N/a    Allergy reviewed : Yes    Meds 2 Beds : No    Outpatient pharmacy confirmed and updated in chart : Yes    Ph[facility] name: [facility] [place]    The list below reflectives the updated PTA list. Please review each medication in order reconciliation for additional clarification and justification.    Prior to Admission Medications   Prescriptions Last Dose Informant   atorvastatin (Lipitor) 20 mg tablet 3/4/2025    Sig: Take 1 tablet (20 mg) by mouth once daily.   chlorhexidine (Peridex) 0.12 % solution 3/4/2025 Self   Sig: Rinse mouth with 15 ml after toothbrushing the night before surgery and on the morning of surgery.  Expectorate after rinsing.  Do not swallow.   cloNIDine (Catapres) 0.1 mg tablet 3/4/2025 Self   Sig: Take 1 tablet (0.1 mg) by mouth 4 times a day.   gabapentin (Neurontin) 100 mg capsule 3/3/2025 Self   Sig: Take one capsule by mouth starting three days before surgery at bedtime.   metroNIDAZOLE (Flagyl) 250 mg tablet 3/3/2025 Self   Sig: Take one tablet at 6p, 7p, and 11p the night before surgery.   neomycin (Mycifradin) 500 mg tablet 3/3/2025 Self   Sig: Take two tabs by mouth at 6p, 7pm, and 11p the night before surgery.   omeprazole (PriLOSEC) 20 mg DR capsule 3/4/2025 Self   Sig: TAKE 1 CAPSULE DAILY. DO   NOT CRUSH OR CHEW   oxybutynin (Ditropan) 5 mg tablet 3/3/2025 Self   Sig: TAKE 1 TABLET TWICE A DAY   triamterene-hydrochlorothiazid (Dyazide) 37.5-25 mg capsule 3/4/2025 Self   Sig: TAKE 1 CAPSULE BY MOUTH ONCE DAILY IN THE MORNING.   verapamil SR (Calan-SR) 240 mg ER tablet 3/2/2025    Sig: Take 1 tablet (240 mg) by mouth once daily.      Facility-Administered Medications: None        The list below reflectives the updated allergy list. Please review each documented allergy for additional clarification and justification.    Allergies   Allergen Reactions    Sulfa (Sulfonamide Antibiotics) Hives          03/07/25 at 9:53 AM - Daryl Jean

## 2025-03-07 NOTE — CARE PLAN
The patient's goals for the shift include pain control    The clinical goals for the shift include maintain safety    Over the shift, the patient did not make progress toward the following goals. Barriers to progression include pain. Recommendations to address these barriers include pain management.

## 2025-03-07 NOTE — CARE PLAN
The patient's goals for the shift include      The clinical goals for the shift include maintain pt safety    Problem: Pain - Adult  Goal: Verbalizes/displays adequate comfort level or baseline comfort level  Outcome: Progressing     Problem: Safety - Adult  Goal: Free from fall injury  Outcome: Progressing     Problem: Discharge Planning  Goal: Discharge to home or other facility with appropriate resources  Outcome: Progressing     Problem: Chronic Conditions and Co-morbidities  Goal: Patient's chronic conditions and co-morbidity symptoms are monitored and maintained or improved  Outcome: Progressing     Problem: Nutrition  Goal: Nutrient intake appropriate for maintaining nutritional needs  Outcome: Progressing     Problem: Pain  Goal: Takes deep breaths with improved pain control throughout the shift  Outcome: Progressing  Goal: Turns in bed with improved pain control throughout the shift  Outcome: Progressing  Goal: Walks with improved pain control throughout the shift  Outcome: Progressing  Goal: Performs ADL's with improved pain control throughout shift  Outcome: Progressing  Goal: Participates in PT with improved pain control throughout the shift  Outcome: Progressing  Goal: Free from opioid side effects throughout the shift  Outcome: Progressing  Goal: Free from acute confusion related to pain meds throughout the shift  Outcome: Progressing

## 2025-03-07 NOTE — DISCHARGE INSTRUCTIONS
[facility]  [facility] [place] [address]   Phone: [phone]  Fax: [phone]     Ostomy Supply List  PATIENT NAME:  Freda Douglas                                                                   DATE:  2025     STRE[address] AD[address]:  [address]      CITY:  [facility] [place]   STATE:  [place]                                            ZIP:  [address]     PHONE: [phone]    :  1960     DIAGNOSIS:  Rectal Prolapse          STOMA TYPE:  End colostomy     Item Order # Brand Description Qty/Unit 30 Day Use   Wafer   70390 [place] 2 ¼” New Image soft convex wafer 5/Box  4 Boxes   Pouch-drainable 18003-transparent    90210-bumrg [place] 2 ¼” New Image drainable pouch 10/Box 2 Boxes   Pouch-closed end 18363-transparent    94886-qrpsm [place] 2 ¼” New Image closed end pouch with filter 60/Box 1 Box   Stoma Powder   7906 [place] Adapt Powder 1 Bottle As needed   Barrier Ring   [address] [place] Adapt Cera Ring 10/Box  2 Boxes   Adhesive Removers 7760   [place]imo Adapt Universal Remover Wipes 50/Box 1 Box   Belt   [address] [place]  Adapt belt 1 Belt 1 Belt     Refill #: 12      Attending Physician:  Dr. Frias   Office Phone: [phone]   Office Fax: [phone]     [facility] RN: Vinita OCAMPON, RN, CWOCN             Office Phone: [phone]     Notes:               HOW TO CHANGE YOUR COLOSTOMY POUCH  Gather Supplies:  Soft paper towels or washcloths (Bounty, Brawny, Viva for example)  Non-lotion/Non-oily soap (Ivory® or Dial® are recommended)  Scissors  Baseplate/Wafer   Drainable Pouch   Accessory products: measuring guide, adhesive removers, stoma paste or barrier ring, stoma powder, belt  Prepare the New Pouch:  Trace the pattern onto the cover paper on the back of the pouch. (Note: the hole should be 1/8” larger than the stoma)  Cut the hole out of the baseplate/Wafer.  Remove the cover paper from the back of the baseplate/Wafer.  Apply a bead of paste or the moldable  ring around the opening on the back of the baseplate/Wafer.  Make “dog ears” on one end of the papers that cover the tape collar of the baseplate/Wafer.  Set the wafer aside with the sticky side facing up.  Close the pouch and set it aside.  Remove the Worn Pouch:  Empty the contents of the worn pouch into the toilet.   Wipe the end of the pouch clean using damp toilet tissue or paper towel.  Use the adhesive remover wipe or a soapy cloth and gentle pressure to remove the worn pouch from the skin.  Clean the Skin:  Wash the skin around the stoma with non-lotion soap.  Rinse the skin with warm water.  Pat the skin dry using a dry washcloth or paper towel.  If needed, apply stoma powder to any red or irritated skin.   Thoroughly brush the excess powder off.   Apply the New Pouch:  Make the skin taut and then center the stoma into the hole of the baseplate/Wafer and press into place.   Center pouch over the wafer and snap the baseplate and wafer together. Check for secure seal.  Apply warm hand over the pouch for a few moments.  Attach belt snugly, but not tightly to pouch. (Two fingers should fit between abdomen and belt.)  *Check the size of your stoma at least weekly for the first 6 weeks following surgery.   *The best time to change your pouch is first thing in the morning, before you have eaten or had anything to drink.  *Change your pouch every 3-4 days (twice a week) for the first 6 weeks after surgery. After that time, you can increase the wear time to 5-7 days, as long as you don't encounter leaks and your skin is in good condition.  *If you have questions about changing your pouch or if you encounter leaks or irritated skin please contact your Ostomy Nurse by calling [phone].        Your ostomy is formed from the large intestine.  Your stoma should function around the clock with the passing of gas or stool.  If you become bloated and have no output from stoma in several hours call the office.   The goal  of the ostomy output is an oatmeal or pudding consistency. Output should be less than 700cc (one liter) per 24 hours. You may empty your pouch when it is half full, on average four times per day.    Measure the output each time you empty the pouch, and record the amount on the tally sheet given to you. Calculate a 24 hour total at the same time each day.      Drink plenty of a variety of fluids to prevent dehydration. Signs of dehydration are: dry mouth, dark yellow urine in small amounts, and dizziness with change in position or increased feeling of weakness/tiredness.     Soft diet (no raw fruits or vegetables; food should be soft enough to smash or cut with a fork) for 5-7 days after hospital discharge.  After this time you should transition to your regular diet.    For diarrhea stools incorporate foods from the BRAT diet.  This is a bland diet that consists of foods low in fiber: bananas, rice, applesauce and toast are staples of the diet.  You can also add tea, tapioca and yogurt if you would like.  Be sure to drink plenty of fluids if you are having diarrhea, as you can become dehydrated quickly.     If you have diarrhea or have an ostomy that is putting out very watery stool (output exceeds 700 ml in 24 hours):   Please incorporate the following foods into your diet.  These foods add bulk to your stools or thicken up the stools  o Creamy peanut butter on toast or crackers  o Yogurt  o Bananas  o Applesauce  o Rice  o Pasta  o Pretzels  o Marshmallows   Avoid foods high in sugar and caffeine.  Examples: soda, coffee, cakes, pies, cookies   Avoid eating and drinking at the same time.  Ingest fluids about 30-45 minutes after solid food   Imodium:  Take 1-2 tablets by mouth ½ hour before breakfast, lunch, dinner, and at bedtime as needed.   Do not exceed 8 tablets in 24 hours.   Lomotil:  Take 1-2 tablets by mouth ½ hour before breakfast, lunch, dinner, and at bedtime as needed.  Do not exceed 8 tablets in 24  hours.   Metamucil fiber cookies:  Metamucil fiber cookies are used to add bulk to your stools and therefore stopping diarrhea.   You will need to doctor yourself to determine how many of these cookies you need per day.   Please drink 64 oz of G2 (Gatorade electrolyte beverage) per day.

## 2025-03-07 NOTE — NURSING NOTE
HOW TO CHANGE YOUR COLOSTOMY POUCH  Gather Supplies:  Soft paper towels or washcloths (Bounty, Brawny, Viva for example)  Non-lotion/Non-oily soap (Ivory® or Dial® are recommended)  Scissors  Baseplate/Wafer   Drainable Pouch   Accessory products: measuring guide, adhesive removers, stoma paste or barrier ring, stoma powder, belt  Prepare the New Pouch:  Trace the pattern onto the cover paper on the back of the pouch. (Note: the hole should be 1/8” larger than the stoma)  Cut the hole out of the baseplate/Wafer.  Remove the cover paper from the back of the baseplate/Wafer.  Apply a bead of paste or the moldable ring around the opening on the back of the baseplate/Wafer.  Make “dog ears” on one end of the papers that cover the tape collar of the baseplate/Wafer.  Set the wafer aside with the sticky side facing up.  Close the pouch and set it aside.  Remove the Worn Pouch:  Empty the contents of the worn pouch into the toilet.   Wipe the end of the pouch clean using damp toilet tissue or paper towel.  Use the adhesive remover wipe or a soapy cloth and gentle pressure to remove the worn pouch from the skin.  Clean the Skin:  Wash the skin around the stoma with non-lotion soap.  Rinse the skin with warm water.  Pat the skin dry using a dry washcloth or paper towel.  If needed, apply stoma powder to any red or irritated skin.   Thoroughly brush the excess powder off.   Apply the New Pouch:  Make the skin taut and then center the stoma into the hole of the baseplate/Wafer and press into place.   Center pouch over the wafer and snap the baseplate and wafer together. Check for secure seal.  Apply warm hand over the pouch for a few moments.  Attach belt snugly, but not tightly to pouch. (Two fingers should fit between abdomen and belt.)  Check the size of your stoma at least weekly for the first 6 weeks following surgery.   The best time to change your pouch is first thing in the morning, before you have eaten or had  anything to drink.  Change your pouch every 3-4 days (twice a week) for the first 6 weeks after surgery. After that time, you can increase the wear time to 5-7 days, as long as you don't encounter leaks and your skin is in good condition.  If you have questions about changing your pouch or if you encounter leaks or irritated skin please contact your Ostomy Nurse by calling [phone].

## 2025-03-08 LAB
ANION GAP SERPL CALC-SCNC: 10 MMOL/L (ref 10–20)
BACTERIA UR CULT: NO GROWTH
BUN SERPL-MCNC: 16 MG/DL (ref 6–23)
CALCIUM SERPL-MCNC: 8.7 MG/DL (ref 8.6–10.3)
CHLORIDE SERPL-SCNC: 103 MMOL/L (ref 98–107)
CO2 SERPL-SCNC: 26 MMOL/L (ref 21–32)
CREAT SERPL-MCNC: 0.6 MG/DL (ref 0.5–1.05)
EGFRCR SERPLBLD CKD-EPI 2021: >90 ML/MIN/1.73M*2
ERYTHROCYTE [DISTWIDTH] IN BLOOD BY AUTOMATED COUNT: 13.1 % (ref 11.5–14.5)
GLUCOSE SERPL-MCNC: 109 MG/DL (ref 74–99)
HCT VFR BLD AUTO: 31.4 % (ref 36–46)
HGB BLD-MCNC: 10.8 G/DL (ref 12–16)
MCH RBC QN AUTO: 27.6 PG (ref 26–34)
MCHC RBC AUTO-ENTMCNC: 34.4 G/DL (ref 32–36)
MCV RBC AUTO: 80 FL (ref 80–100)
NRBC BLD-RTO: 0 /100 WBCS (ref 0–0)
PLATELET # BLD AUTO: 307 X10*3/UL (ref 150–450)
POTASSIUM SERPL-SCNC: 3.4 MMOL/L (ref 3.5–5.3)
RBC # BLD AUTO: 3.92 X10*6/UL (ref 4–5.2)
SODIUM SERPL-SCNC: 136 MMOL/L (ref 136–145)
WBC # BLD AUTO: 12.3 X10*3/UL (ref 4.4–11.3)

## 2025-03-08 PROCEDURE — 2500000002 HC RX 250 W HCPCS SELF ADMINISTERED DRUGS (ALT 637 FOR MEDICARE OP, ALT 636 FOR OP/ED): Performed by: STUDENT IN AN ORGANIZED HEALTH CARE EDUCATION/TRAINING PROGRAM

## 2025-03-08 PROCEDURE — 2500000005 HC RX 250 GENERAL PHARMACY W/O HCPCS

## 2025-03-08 PROCEDURE — 2500000001 HC RX 250 WO HCPCS SELF ADMINISTERED DRUGS (ALT 637 FOR MEDICARE OP): Performed by: STUDENT IN AN ORGANIZED HEALTH CARE EDUCATION/TRAINING PROGRAM

## 2025-03-08 PROCEDURE — 36415 COLL VENOUS BLD VENIPUNCTURE: CPT | Performed by: STUDENT IN AN ORGANIZED HEALTH CARE EDUCATION/TRAINING PROGRAM

## 2025-03-08 PROCEDURE — 85027 COMPLETE CBC AUTOMATED: CPT | Performed by: STUDENT IN AN ORGANIZED HEALTH CARE EDUCATION/TRAINING PROGRAM

## 2025-03-08 PROCEDURE — 1100000001 HC PRIVATE ROOM DAILY

## 2025-03-08 PROCEDURE — 99232 SBSQ HOSP IP/OBS MODERATE 35: CPT | Performed by: STUDENT IN AN ORGANIZED HEALTH CARE EDUCATION/TRAINING PROGRAM

## 2025-03-08 PROCEDURE — 2500000002 HC RX 250 W HCPCS SELF ADMINISTERED DRUGS (ALT 637 FOR MEDICARE OP, ALT 636 FOR OP/ED): Performed by: COLON & RECTAL SURGERY

## 2025-03-08 PROCEDURE — 2500000004 HC RX 250 GENERAL PHARMACY W/ HCPCS (ALT 636 FOR OP/ED)

## 2025-03-08 PROCEDURE — 80048 BASIC METABOLIC PNL TOTAL CA: CPT | Performed by: STUDENT IN AN ORGANIZED HEALTH CARE EDUCATION/TRAINING PROGRAM

## 2025-03-08 PROCEDURE — 2500000004 HC RX 250 GENERAL PHARMACY W/ HCPCS (ALT 636 FOR OP/ED): Performed by: STUDENT IN AN ORGANIZED HEALTH CARE EDUCATION/TRAINING PROGRAM

## 2025-03-08 RX ORDER — POTASSIUM CHLORIDE 750 MG/1
10 TABLET, FILM COATED, EXTENDED RELEASE ORAL ONCE
Status: COMPLETED | OUTPATIENT
Start: 2025-03-08 | End: 2025-03-08

## 2025-03-08 RX ORDER — OXYCODONE HYDROCHLORIDE 5 MG/1
5 TABLET ORAL EVERY 4 HOURS PRN
Status: DISCONTINUED | OUTPATIENT
Start: 2025-03-08 | End: 2025-03-09 | Stop reason: HOSPADM

## 2025-03-08 RX ADMIN — CLONIDINE HYDROCHLORIDE 0.1 MG: 0.1 TABLET ORAL at 16:37

## 2025-03-08 RX ADMIN — PANTOPRAZOLE SODIUM 40 MG: 40 TABLET, DELAYED RELEASE ORAL at 06:44

## 2025-03-08 RX ADMIN — GABAPENTIN 100 MG: 100 CAPSULE ORAL at 14:15

## 2025-03-08 RX ADMIN — GABAPENTIN 100 MG: 100 CAPSULE ORAL at 20:34

## 2025-03-08 RX ADMIN — CLONIDINE HYDROCHLORIDE 0.1 MG: 0.1 TABLET ORAL at 20:34

## 2025-03-08 RX ADMIN — TAMSULOSIN HYDROCHLORIDE 0.4 MG: 0.4 CAPSULE ORAL at 09:34

## 2025-03-08 RX ADMIN — LIDOCAINE 4% 1 PATCH: 40 PATCH TOPICAL at 09:34

## 2025-03-08 RX ADMIN — OXYBUTYNIN CHLORIDE 5 MG: 5 TABLET ORAL at 20:34

## 2025-03-08 RX ADMIN — VERAPAMIL HYDROCHLORIDE 240 MG: 120 TABLET ORAL at 10:08

## 2025-03-08 RX ADMIN — POTASSIUM CHLORIDE 10 MEQ: 750 TABLET, FILM COATED, EXTENDED RELEASE ORAL at 09:33

## 2025-03-08 RX ADMIN — ACETAMINOPHEN 975 MG: 325 TABLET, FILM COATED ORAL at 06:44

## 2025-03-08 RX ADMIN — KETOROLAC TROMETHAMINE 15 MG: 30 INJECTION, SOLUTION INTRAMUSCULAR at 14:16

## 2025-03-08 RX ADMIN — ENOXAPARIN SODIUM 40 MG: 40 INJECTION SUBCUTANEOUS at 09:34

## 2025-03-08 RX ADMIN — KETOROLAC TROMETHAMINE 15 MG: 30 INJECTION, SOLUTION INTRAMUSCULAR at 09:33

## 2025-03-08 RX ADMIN — OXYBUTYNIN CHLORIDE 5 MG: 5 TABLET ORAL at 09:34

## 2025-03-08 RX ADMIN — TRIAMTERENE AND HYDROCHLOROTHIAZIDE 1 TABLET: 37.5; 25 TABLET ORAL at 09:34

## 2025-03-08 RX ADMIN — KETOROLAC TROMETHAMINE 15 MG: 30 INJECTION, SOLUTION INTRAMUSCULAR at 03:19

## 2025-03-08 RX ADMIN — ONDANSETRON 4 MG: 2 INJECTION, SOLUTION INTRAMUSCULAR; INTRAVENOUS at 16:37

## 2025-03-08 RX ADMIN — CLONIDINE HYDROCHLORIDE 0.1 MG: 0.1 TABLET ORAL at 12:11

## 2025-03-08 RX ADMIN — CLONIDINE HYDROCHLORIDE 0.1 MG: 0.1 TABLET ORAL at 06:44

## 2025-03-08 RX ADMIN — GABAPENTIN 100 MG: 100 CAPSULE ORAL at 09:33

## 2025-03-08 RX ADMIN — ACETAMINOPHEN 975 MG: 325 TABLET, FILM COATED ORAL at 14:15

## 2025-03-08 ASSESSMENT — COGNITIVE AND FUNCTIONAL STATUS - GENERAL
MOBILITY SCORE: 24
CLIMB 3 TO 5 STEPS WITH RAILING: A LITTLE
DRESSING REGULAR LOWER BODY CLOTHING: A LITTLE
DAILY ACTIVITIY SCORE: 23
DAILY ACTIVITIY SCORE: 23
HELP NEEDED FOR BATHING: A LITTLE
MOBILITY SCORE: 23

## 2025-03-08 ASSESSMENT — PAIN - FUNCTIONAL ASSESSMENT
PAIN_FUNCTIONAL_ASSESSMENT: 0-10

## 2025-03-08 ASSESSMENT — PAIN SCALES - GENERAL
PAINLEVEL_OUTOF10: 0 - NO PAIN
PAINLEVEL_OUTOF10: 0 - NO PAIN
PAINLEVEL_OUTOF10: 2
PAINLEVEL_OUTOF10: 0 - NO PAIN
PAINLEVEL_OUTOF10: 3
PAINLEVEL_OUTOF10: 0 - NO PAIN
PAINLEVEL_OUTOF10: 1

## 2025-03-08 ASSESSMENT — PAIN DESCRIPTION - LOCATION: LOCATION: ABDOMEN

## 2025-03-08 NOTE — PROGRESS NOTES
Freda Douglas is a 64 y.o. female on day 4 of admission presenting with Rectal prolapse.      Subjective   TREVER. No more episodes of emesis after the one yesterday morning. Still with intermittent nausea, no appetite. Ambulated halls x 2 yesterday. Took milk of mag without success.     Denies  F/C, CP, SOB,       Objective     PE:  Constitutional: A&Ox3, calm and cooperative, NAD  Eyes: EOMI, clear sclera   Cardiovascular: Normal rate and regular rhythm  Respiratory/Thorax: CTAB, on RA  Gastrointestinal: abd soft, minimally tender, hypoactive bowel sounds, Ostomy is beefy red, moist, well pouched with minimal tan liquid OP, intubated.   Genitourinary: Voiding independently   Extremities: No peripheral edema  Neurological: A&Ox3, No focal deficits   Psychological: Appropriate mood and behavior      Last Recorded Vitals  Vitals:    03/07/25 2006 03/07/25 2343 03/08/25 0417 03/08/25 0728   BP: 132/63 128/65 149/74 166/76   BP Location: Right arm Right arm Left arm    Patient Position: Lying Lying Lying    Pulse: 84 84 92 88   Resp: 18 18 18 16   Temp: 36.8 °C (98.2 °F) 36.9 °C (98.4 °F) 37 °C (98.6 °F) 36.2 °C (97.2 °F)   TempSrc: Temporal Temporal Temporal    SpO2: 96% 98% 96% 93%   Weight:       Height:             Relevant Results    Imaging:     .=== 08/19/22 ===    CHEST 2 VIEW    - Impression -  No evidence of acute intrathoracic abnormality.   .=== 01/13/25 ===    CT ABDOMEN PELVIS W IV CONTRAST    - Impression -  1. Status post sacrectomy for giant cell tumor. No recurrent tumor.  2. Constipation.  3. Benign hepatic cysts.    MACRO:  None    Signed by: Effie Leo 1/14/2025 10:06 AM  Dictation workstation:   [id number]         Lab Results:   Lab Results   Component Value Date    WBC 12.3 (H) 03/08/2025    HGB 10.8 (L) 03/08/2025    HCT 31.4 (L) 03/08/2025    MCV 80 03/08/2025     03/08/2025     Lab Results   Component Value Date    GLUCOSE 109 (H) 03/08/2025    CALCIUM 8.7 03/08/2025      "03/08/2025    K 3.4 (L) 03/08/2025    CO2 26 03/08/2025     03/08/2025    BUN 16 03/08/2025    CREATININE 0.60 03/08/2025         Estimated Creatinine Clearance: 90.2 mL/min (by C-G formula based on SCr of 0.6 mg/dL).  No results found for: \"CRP\"      Assessment/Plan   Freda Douglas is a 64 y.o. female on day 4 of admission presenting with Rectal prolapse.    POD3 Laparoscopic Proctectomy, end colostomy  with Dr. Frias, complicated by ileus   Intra-op findings: no abdominal adhesions     GI: no appetite. Intermittent nausea.   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, rectal prolapse, and severe fecal and urinary incontinence, GERD  -NPO with sips, ice chips  -PRN Antiemetic   -ERAS: chewing gum, clay BID when back on diet   -nutrition consult  -enterostomal nurse following   - daily PPI    Neuro: Acute postop pain as expected - well controlled on current medication regimen   Hx: anxiety, awareness under anesthesia  -pain regimen (lidocaine patch, robaxin, toradol, narcotics)  - minimize narcotics as able, stop oxy 10     CV: BP hypertensive  Hx: HLD, HTN  -Continue to monitor vital signs   - home clonidine, maxzide, verapamil       Resp: CTAB, on RA  -IS Q 10x/hour while awake   - respiratory care     : Voiding via abebe   - Cr. 0.60  - K 3.4- will replace  - monitor I and Os  - flomax, oxybutynin      Heme: H/H 10.8/31.4  -DVT proph: SCDs/ TEDs/lovenox  - monitor s/sx of active bleeding    Endo:   - no hx of issues    ID: Afebrile, wbc downtrending 12.3  -Monitor for s/s of infection   - UA 6-10 wbcs, 25 leukocytes, 1-25 epithelial cells  - will hold off on abx, monitor culture      Discussed with Dr. Frias. ABRF      I spent 35 minutes in the professional and overall care of this patient.      Codie Osuna PA-C    Pt feeling hungry - stoma had a lot out this AM    Gen - looking well, nAD  Abd - softly distended, NT, stoma with thin tan stool, " nice and pink   Extr - no edema    Impression - Pt doing great s/p CP/EC    Plan  OOB  IS  Lovenox/SCD's   Does not need lovenox for home  Low residue diet home later vs tomorrow based on how she is doing.

## 2025-03-08 NOTE — CARE PLAN
The patient's goals for the shift include      The clinical goals for the shift include pain management and monitor colostomy output    Problem: Pain - Adult  Goal: Verbalizes/displays adequate comfort level or baseline comfort level  3/8/2025 1245 by Jackson Hale RN  Outcome: Progressing  3/8/2025 1245 by Jackson Hale RN  Outcome: Progressing     Problem: Safety - Adult  Goal: Free from fall injury  3/8/2025 1245 by Jackson Hale RN  Outcome: Progressing  3/8/2025 1245 by Jackson Hale RN  Outcome: Progressing     Problem: Discharge Planning  Goal: Discharge to home or other facility with appropriate resources  3/8/2025 1245 by Jackson Hale RN  Outcome: Progressing  3/8/2025 1245 by Jackson Hale RN  Outcome: Progressing     Problem: Chronic Conditions and Co-morbidities  Goal: Patient's chronic conditions and co-morbidity symptoms are monitored and maintained or improved  3/8/2025 1245 by Jackson Hale RN  Outcome: Progressing  3/8/2025 1245 by Jackson Hale RN  Outcome: Progressing     Problem: Nutrition  Goal: Nutrient intake appropriate for maintaining nutritional needs  3/8/2025 1245 by Jackson Hale RN  Outcome: Progressing  3/8/2025 1245 by Jackson Hale RN  Outcome: Progressing     Problem: Pain  Goal: Takes deep breaths with improved pain control throughout the shift  3/8/2025 1245 by Jackson Hale RN  Outcome: Progressing  3/8/2025 1245 by Jackson Hale RN  Outcome: Progressing  Goal: Turns in bed with improved pain control throughout the shift  3/8/2025 1245 by Jackson Hale RN  Outcome: Progressing  3/8/2025 1245 by Jackson Hale RN  Outcome: Progressing  Goal: Walks with improved pain control throughout the shift  3/8/2025 1245 by Jackson Hale RN  Outcome: Progressing  3/8/2025 1245 by Jackson Hale RN  Outcome: Progressing  Goal: Performs ADL's with improved pain control throughout shift  3/8/2025 1245 by Jackson Hale RN  Outcome: Progressing  3/8/2025 1245 by Jackson Hale RN  Outcome: Progressing  Goal: Participates in PT with improved pain  control throughout the shift  3/8/2025 1245 by Jackson Hale RN  Outcome: Progressing  3/8/2025 1245 by Jackson Hale RN  Outcome: Progressing  Goal: Free from opioid side effects throughout the shift  3/8/2025 1245 by Jackson Hale RN  Outcome: Progressing  3/8/2025 1245 by Jackson Hale RN  Outcome: Progressing  Goal: Free from acute confusion related to pain meds throughout the shift  3/8/2025 1245 by Jackson Hale RN  Outcome: Progressing  3/8/2025 1245 by Jackson Hale RN  Outcome: Progressing

## 2025-03-08 NOTE — CARE PLAN
The patient's goals for the shift include      The clinical goals for the shift include pain management and monitor colostomy output    Problem: Pain - Adult  Goal: Verbalizes/displays adequate comfort level or baseline comfort level  Outcome: Progressing     Problem: Safety - Adult  Goal: Free from fall injury  Outcome: Progressing     Problem: Chronic Conditions and Co-morbidities  Goal: Patient's chronic conditions and co-morbidity symptoms are monitored and maintained or improved  Outcome: Progressing

## 2025-03-09 ENCOUNTER — HOME HEALTH ADMISSION (OUTPATIENT)
Dept: HOME HEALTH SERVICES | Facility: HOME HEALTH | Age: 65
End: 2025-03-09
Payer: MEDICARE

## 2025-03-09 ENCOUNTER — DOCUMENTATION (OUTPATIENT)
Dept: HOME HEALTH SERVICES | Facility: HOME HEALTH | Age: 65
End: 2025-03-09
Payer: MEDICARE

## 2025-03-09 VITALS
HEIGHT: 63 IN | TEMPERATURE: 97.7 F | DIASTOLIC BLOOD PRESSURE: 79 MMHG | HEART RATE: 90 BPM | BODY MASS INDEX: 28.95 KG/M2 | SYSTOLIC BLOOD PRESSURE: 131 MMHG | WEIGHT: 163.36 LBS | RESPIRATION RATE: 18 BRPM | OXYGEN SATURATION: 94 %

## 2025-03-09 LAB
ANION GAP SERPL CALC-SCNC: 13 MMOL/L (ref 10–20)
BUN SERPL-MCNC: 17 MG/DL (ref 6–23)
CALCIUM SERPL-MCNC: 8.8 MG/DL (ref 8.6–10.3)
CHLORIDE SERPL-SCNC: 101 MMOL/L (ref 98–107)
CO2 SERPL-SCNC: 26 MMOL/L (ref 21–32)
CREAT SERPL-MCNC: 0.65 MG/DL (ref 0.5–1.05)
EGFRCR SERPLBLD CKD-EPI 2021: >90 ML/MIN/1.73M*2
ERYTHROCYTE [DISTWIDTH] IN BLOOD BY AUTOMATED COUNT: 13.3 % (ref 11.5–14.5)
GLUCOSE SERPL-MCNC: 106 MG/DL (ref 74–99)
HCT VFR BLD AUTO: 33.9 % (ref 36–46)
HGB BLD-MCNC: 11.2 G/DL (ref 12–16)
MCH RBC QN AUTO: 28.1 PG (ref 26–34)
MCHC RBC AUTO-ENTMCNC: 33 G/DL (ref 32–36)
MCV RBC AUTO: 85 FL (ref 80–100)
NRBC BLD-RTO: 0 /100 WBCS (ref 0–0)
PLATELET # BLD AUTO: 344 X10*3/UL (ref 150–450)
POTASSIUM SERPL-SCNC: 3.6 MMOL/L (ref 3.5–5.3)
RBC # BLD AUTO: 3.98 X10*6/UL (ref 4–5.2)
SODIUM SERPL-SCNC: 136 MMOL/L (ref 136–145)
WBC # BLD AUTO: 10.1 X10*3/UL (ref 4.4–11.3)

## 2025-03-09 PROCEDURE — 2500000004 HC RX 250 GENERAL PHARMACY W/ HCPCS (ALT 636 FOR OP/ED): Performed by: STUDENT IN AN ORGANIZED HEALTH CARE EDUCATION/TRAINING PROGRAM

## 2025-03-09 PROCEDURE — 2500000002 HC RX 250 W HCPCS SELF ADMINISTERED DRUGS (ALT 637 FOR MEDICARE OP, ALT 636 FOR OP/ED): Performed by: COLON & RECTAL SURGERY

## 2025-03-09 PROCEDURE — 85027 COMPLETE CBC AUTOMATED: CPT | Performed by: STUDENT IN AN ORGANIZED HEALTH CARE EDUCATION/TRAINING PROGRAM

## 2025-03-09 PROCEDURE — 36415 COLL VENOUS BLD VENIPUNCTURE: CPT | Performed by: STUDENT IN AN ORGANIZED HEALTH CARE EDUCATION/TRAINING PROGRAM

## 2025-03-09 PROCEDURE — 2500000005 HC RX 250 GENERAL PHARMACY W/O HCPCS

## 2025-03-09 PROCEDURE — 2500000002 HC RX 250 W HCPCS SELF ADMINISTERED DRUGS (ALT 637 FOR MEDICARE OP, ALT 636 FOR OP/ED): Performed by: STUDENT IN AN ORGANIZED HEALTH CARE EDUCATION/TRAINING PROGRAM

## 2025-03-09 PROCEDURE — 2500000001 HC RX 250 WO HCPCS SELF ADMINISTERED DRUGS (ALT 637 FOR MEDICARE OP): Performed by: STUDENT IN AN ORGANIZED HEALTH CARE EDUCATION/TRAINING PROGRAM

## 2025-03-09 PROCEDURE — 80048 BASIC METABOLIC PNL TOTAL CA: CPT | Performed by: STUDENT IN AN ORGANIZED HEALTH CARE EDUCATION/TRAINING PROGRAM

## 2025-03-09 RX ORDER — IBUPROFEN 600 MG/1
600 TABLET ORAL EVERY 6 HOURS PRN
Qty: 30 TABLET | Refills: 0 | Status: SHIPPED | OUTPATIENT
Start: 2025-03-09

## 2025-03-09 RX ORDER — OXYCODONE HYDROCHLORIDE 5 MG/1
5 TABLET ORAL EVERY 6 HOURS PRN
Qty: 8 TABLET | Refills: 0 | Status: SHIPPED | OUTPATIENT
Start: 2025-03-09

## 2025-03-09 RX ORDER — ACETAMINOPHEN 325 MG/1
650 TABLET ORAL EVERY 6 HOURS PRN
Qty: 30 TABLET | Refills: 0 | Status: SHIPPED | OUTPATIENT
Start: 2025-03-09

## 2025-03-09 RX ORDER — ONDANSETRON 4 MG/1
4 TABLET, FILM COATED ORAL EVERY 8 HOURS PRN
Qty: 20 TABLET | Refills: 0 | Status: SHIPPED | OUTPATIENT
Start: 2025-03-09

## 2025-03-09 RX ADMIN — CLONIDINE HYDROCHLORIDE 0.1 MG: 0.1 TABLET ORAL at 12:33

## 2025-03-09 RX ADMIN — TAMSULOSIN HYDROCHLORIDE 0.4 MG: 0.4 CAPSULE ORAL at 09:22

## 2025-03-09 RX ADMIN — CLONIDINE HYDROCHLORIDE 0.1 MG: 0.1 TABLET ORAL at 06:08

## 2025-03-09 RX ADMIN — PANTOPRAZOLE SODIUM 40 MG: 40 TABLET, DELAYED RELEASE ORAL at 06:08

## 2025-03-09 RX ADMIN — ACETAMINOPHEN 975 MG: 325 TABLET, FILM COATED ORAL at 06:07

## 2025-03-09 RX ADMIN — GABAPENTIN 100 MG: 100 CAPSULE ORAL at 09:19

## 2025-03-09 RX ADMIN — OXYBUTYNIN CHLORIDE 5 MG: 5 TABLET ORAL at 09:19

## 2025-03-09 RX ADMIN — ACETAMINOPHEN 975 MG: 325 TABLET, FILM COATED ORAL at 00:53

## 2025-03-09 RX ADMIN — TRIAMTERENE AND HYDROCHLOROTHIAZIDE 1 TABLET: 37.5; 25 TABLET ORAL at 09:19

## 2025-03-09 RX ADMIN — LIDOCAINE 4% 1 PATCH: 40 PATCH TOPICAL at 09:21

## 2025-03-09 RX ADMIN — ENOXAPARIN SODIUM 40 MG: 40 INJECTION SUBCUTANEOUS at 09:20

## 2025-03-09 RX ADMIN — VERAPAMIL HYDROCHLORIDE 240 MG: 120 TABLET ORAL at 09:19

## 2025-03-09 RX ADMIN — ACETAMINOPHEN 975 MG: 325 TABLET, FILM COATED ORAL at 12:33

## 2025-03-09 ASSESSMENT — COGNITIVE AND FUNCTIONAL STATUS - GENERAL
MOBILITY SCORE: 24
DAILY ACTIVITIY SCORE: 23
HELP NEEDED FOR BATHING: A LITTLE

## 2025-03-09 ASSESSMENT — PAIN SCALES - GENERAL
PAINLEVEL_OUTOF10: 0 - NO PAIN
PAINLEVEL_OUTOF10: 1
PAINLEVEL_OUTOF10: 1

## 2025-03-09 ASSESSMENT — PAIN - FUNCTIONAL ASSESSMENT
PAIN_FUNCTIONAL_ASSESSMENT: 0-10

## 2025-03-09 ASSESSMENT — PAIN DESCRIPTION - DESCRIPTORS: DESCRIPTORS: SORE

## 2025-03-09 NOTE — PROGRESS NOTES
3/9/2025 11:14 AM Message sent to  Home Care that patient will be discharged today. Millicent BOYKIN

## 2025-03-09 NOTE — HH CARE COORDINATION
Home Care received a Referral for Nursing. We have processed the referral for a Start of Care on 3/11/25.     If you have any questions or concerns, please feel free to contact us at [phone]. Follow the prompts, enter your five digit zip code, and you will be directed to your care team on CENTL 1.

## 2025-03-09 NOTE — DISCHARGE SUMMARY
Discharge Diagnosis  Rectal prolapse    Issues Requiring Follow-Up  S/P Laparoscopic Proctectomy; End Colostomy    Test Results Pending At Discharge  Pending Labs       Order Current Status    Surgical Pathology Exam In process            Hospital Course   Briefly, the patient is a 64 year-old female with prior hx of rectal prolapse.  Pt opts for Laparoscopic Proctectomy; End Colostomy     HOSPITAL COURSE: The patient underwent Laparoscopic Proctectomy; End Colostomy on 3/4 which patient tolerated very well and remained stable in the postoperative period. Postoperative course complicated by ileus. Patient was ordered oral and intravenous narcotics for pain control and provided education from Nutrition and Enterostomal nurse.  On day of discharge patient was tolerating a diet well, having adequate colostomy output, afebrile with stable vital signs and lab values, and had adequate pain control. The wound was clean and dry. Patient was discharged with home health care and instructed to follow up in the office within 4 weeks after discharge and was given prescription for oxycodone, ibuprofen, and tylenol for pain, and zofran for nausea.      Pertinent Physical Exam At Time of Discharge  Constitutional: A&Ox3, calm and cooperative, NAD  Eyes: EOMI, clear sclera   Cardiovascular: Normal rate and regular rhythm. No murmurs  Respiratory/Thorax: CTAB, on RA  Gastrointestinal: Abdomen soft, nontender, nondistended, +BS. Stoma- red with yellow mush (225mL/24h). Extubated stoma  Genitourinary: Voiding independently   Musculoskeletal: ROM intact  Extremities: No peripheral edema  Neurological: A&Ox3, No focal deficits   Psychological: Appropriate mood and behavior    Home Medications     Medication List      START taking these medications     acetaminophen 325 mg tablet; Commonly known as: Tylenol; Take 2 tablets   (650 mg) by mouth every 6 hours if needed for mild pain (1 - 3).   ibuprofen 600 mg tablet; Take 1 tablet (600 mg)  by mouth every 6 hours   if needed for mild pain (1 - 3).   ondansetron 4 mg tablet; Commonly known as: Zofran; Take 1 tablet (4 mg)   by mouth every 8 hours if needed for nausea or vomiting.   oxyCODONE 5 mg immediate release tablet; Commonly known as: Roxicodone;   Take 1 tablet (5 mg) by mouth every 6 hours if needed for severe pain (7 -   10).     CONTINUE taking these medications     atorvastatin 20 mg tablet; Commonly known as: Lipitor; Take 1 tablet (20   mg) by mouth once daily.   cloNIDine 0.1 mg tablet; Commonly known as: Catapres; Take 1 tablet (0.1   mg) by mouth 4 times a day.   omeprazole 20 mg DR capsule; Commonly known as: PriLOSEC; TAKE 1 CAPSULE   DAILY. DO   NOT CRUSH OR CHEW   oxybutynin 5 mg tablet; Commonly known as: Ditropan; TAKE 1 TABLET TWICE   A DAY   * triamterene-hydrochlorothiazid 37.5-25 mg capsule; Commonly known as:   Dyazide; TAKE 1 CAPSULE BY MOUTH ONCE DAILY IN THE MORNING.   * triamterene-hydrochlorothiazid 37.5-25 mg capsule; Commonly known as:   Dyazide; Take 1 capsule by mouth once daily in the morning.   verapamil  mg ER tablet; Commonly known as: Calan-SR; Take 1   tablet (240 mg) by mouth once daily.  * This list has 2 medication(s) that are the same as other medications   prescribed for you. Read the directions carefully, and ask your doctor or   other care provider to review them with you.     STOP taking these medications     chlorhexidine 0.12 % solution; Commonly known as: Peridex   gabapentin 100 mg capsule; Commonly known as: Neurontin   metroNIDAZOLE 250 mg tablet; Commonly known as: Flagyl   neomycin 500 mg tablet; Commonly known as: Mycifradin       Outpatient Follow-Up  Future Appointments   Date Time Provider Department [place]   4/8/2025 10:00 AM Ishan Chandra MD [id number] [facility]   4/9/2025 10:00 AM MICA Antony [facility]   4/16/2025 10:00 AM MICA Antony   4/23/2025 10:30 AM MICA Antony    4/23/2025  3:40 PM Rosie Frias MD [id number] [place]   4/30/2025 10:30 AM MICA Anton[facility] [place]   5/7/2025  9:30 AM MICA Antony [facility]   5/14/2025 10:00 AM MICA Antony [facility]       Geoffrey Hughes, APRN-CNP

## 2025-03-10 ENCOUNTER — TELEPHONE (OUTPATIENT)
Dept: SURGERY | Facility: CLINIC | Age: 65
End: 2025-03-10
Payer: MEDICARE

## 2025-03-10 NOTE — TELEPHONE ENCOUNTER
Post op call.  She is s/p 3/04/2025 LX Proctectomy with end colostomy.  Discharged home 3/09/2025.  Doing well at home.    Rates pain 0/10.  Taking Tylenol and Ibuprofen as needed.  Able to complete ADL's.  She is getting hungry and is eating small portions.  She does have a slight stomach ache.  Denies n/v.  She is taking Omeprazole.  She has some pudding like stool in the colostomy bag.  She emptied the appliance last night.   Denies pouching issues.  She is urinating without any troubles.  Denies fever/chills.  She has a tiny amount of spotting on her perineal pad. Looks like dried blood.  Normal post op.  I invited Freda to call if she needs anything.  Marine Long RN

## 2025-03-11 ENCOUNTER — HOME CARE VISIT (OUTPATIENT)
Dept: HOME HEALTH SERVICES | Facility: HOME HEALTH | Age: 65
End: 2025-03-11
Payer: MEDICARE

## 2025-03-11 ENCOUNTER — DOCUMENTATION (OUTPATIENT)
Dept: CARE COORDINATION | Facility: CLINIC | Age: 65
End: 2025-03-11
Payer: MEDICARE

## 2025-03-11 PROCEDURE — 1090000002 HH PPS REVENUE DEBIT

## 2025-03-11 PROCEDURE — 1090000001 HH PPS REVENUE CREDIT

## 2025-03-11 PROCEDURE — G0299 HHS/HOSPICE OF RN EA 15 MIN: HCPCS | Mod: HHH

## 2025-03-11 PROCEDURE — 0023 HH SOC

## 2025-03-11 PROCEDURE — 169592 NO-PAY CLAIM PROCEDURE

## 2025-03-11 ASSESSMENT — ACTIVITIES OF DAILY LIVING (ADL)
ENTERING_EXITING_HOME: MINIMUM ASSIST
OASIS_M1830: 03

## 2025-03-11 ASSESSMENT — ENCOUNTER SYMPTOMS
PERSON REPORTING PAIN: PATIENT
APPETITE LEVEL: FAIR
CHANGE IN APPETITE: UNCHANGED
MUSCLE WEAKNESS: 1
DENIES PAIN: 1

## 2025-03-11 NOTE — PROGRESS NOTES
[facility]  Admitted 3/4/2025  Discharged 3/9/2025  Dx: Rectal Prolapse complicated by ileus  S/P: Laparoscopic Proctectomy with end colostomy    Post-op call completed bu  nurse 3/10/2025    CM will follow for additional outreach calls.    Chinyere Johns, RN/CM   [facility]  [phone]

## 2025-03-12 LAB
LABORATORY COMMENT REPORT: NORMAL
PATH REPORT.FINAL DX SPEC: NORMAL
PATH REPORT.GROSS SPEC: NORMAL
PATH REPORT.RELEVANT HX SPEC: NORMAL
PATH REPORT.TOTAL CANCER: NORMAL

## 2025-03-12 PROCEDURE — 1090000002 HH PPS REVENUE DEBIT

## 2025-03-12 PROCEDURE — 1090000001 HH PPS REVENUE CREDIT

## 2025-03-13 PROCEDURE — 1090000002 HH PPS REVENUE DEBIT

## 2025-03-13 PROCEDURE — 1090000001 HH PPS REVENUE CREDIT

## 2025-03-14 ENCOUNTER — HOME CARE VISIT (OUTPATIENT)
Dept: HOME HEALTH SERVICES | Facility: HOME HEALTH | Age: 65
End: 2025-03-14
Payer: MEDICARE

## 2025-03-14 VITALS
HEART RATE: 85 BPM | TEMPERATURE: 98.2 F | OXYGEN SATURATION: 98 % | RESPIRATION RATE: 18 BRPM | DIASTOLIC BLOOD PRESSURE: 64 MMHG | SYSTOLIC BLOOD PRESSURE: 134 MMHG

## 2025-03-14 PROCEDURE — 1090000001 HH PPS REVENUE CREDIT

## 2025-03-14 PROCEDURE — G0300 HHS/HOSPICE OF LPN EA 15 MIN: HCPCS | Mod: HHH

## 2025-03-14 PROCEDURE — 1090000002 HH PPS REVENUE DEBIT

## 2025-03-15 PROCEDURE — 1090000002 HH PPS REVENUE DEBIT

## 2025-03-15 PROCEDURE — 1090000001 HH PPS REVENUE CREDIT

## 2025-03-16 PROCEDURE — 1090000001 HH PPS REVENUE CREDIT

## 2025-03-16 PROCEDURE — 1090000002 HH PPS REVENUE DEBIT

## 2025-03-16 ASSESSMENT — ACTIVITIES OF DAILY LIVING (ADL)
LAUNDRY ASSESSED: 1
SHOPPING ASSESSED: 1
SHOPPING: DEPENDENT
LAUNDRY: DEPENDENT
TRANSPORTATION ASSESSED: 1
TRANSPORTATION: DEPENDENT

## 2025-03-16 ASSESSMENT — PAIN SCALES - PAIN ASSESSMENT IN ADVANCED DEMENTIA (PAINAD)
CONSOLABILITY: 0 - NO NEED TO CONSOLE.
FACIALEXPRESSION: 0
BREATHING: 0
NEGVOCALIZATION: 0
NEGVOCALIZATION: 0 - NONE.
BODYLANGUAGE: 0 - RELAXED.
FACIALEXPRESSION: 0 - SMILING OR INEXPRESSIVE.
CONSOLABILITY: 0
BODYLANGUAGE: 0
TOTALSCORE: 0

## 2025-03-16 ASSESSMENT — ENCOUNTER SYMPTOMS
LAST BOWEL MOVEMENT: 67278
OCCASIONAL FEELINGS OF UNSTEADINESS: 0
DEPRESSION: 0
APPETITE LEVEL: GOOD
LOSS OF SENSATION IN FEET: 0
DENIES PAIN: 1
CHANGE IN APPETITE: UNCHANGED

## 2025-03-17 ENCOUNTER — HOME CARE VISIT (OUTPATIENT)
Dept: HOME HEALTH SERVICES | Facility: HOME HEALTH | Age: 65
End: 2025-03-17
Payer: MEDICARE

## 2025-03-17 VITALS
TEMPERATURE: 97.8 F | DIASTOLIC BLOOD PRESSURE: 64 MMHG | HEART RATE: 86 BPM | RESPIRATION RATE: 16 BRPM | SYSTOLIC BLOOD PRESSURE: 140 MMHG

## 2025-03-17 PROCEDURE — G0299 HHS/HOSPICE OF RN EA 15 MIN: HCPCS | Mod: HHH

## 2025-03-17 PROCEDURE — 1090000002 HH PPS REVENUE DEBIT

## 2025-03-17 PROCEDURE — 1090000001 HH PPS REVENUE CREDIT

## 2025-03-17 ASSESSMENT — ENCOUNTER SYMPTOMS
PERSON REPORTING PAIN: PATIENT
APPETITE LEVEL: FAIR
LOWEST PAIN SEVERITY IN PAST 24 HOURS: 2/10
PAIN: 1
HIGHEST PAIN SEVERITY IN PAST 24 HOURS: 4/10

## 2025-03-17 NOTE — HOME HEALTH
WOC  home nursing visit   identifed by name and bd    stoma type: colostomy   secondary to rectal prolapse. costedio 3/4/2025   size: 1.5in  location: left   protrustion:yes   mucocutaneous junction: intact, visible sutures  mucosal condition and color: red and moist   Peristomal Skin: intact  Peristomal contour: flat   character of output: today liquid output but had not moved bowels in a couple days.    pouching system used : 2 piece red jollister convex closed pouch   accessories used: ring, remover       stoma very active today, but patient reports did not go for the last couple of days.     buttocks wound is healing nicely no signs or symptoms of infection.

## 2025-03-18 PROCEDURE — 1090000001 HH PPS REVENUE CREDIT

## 2025-03-18 PROCEDURE — 1090000002 HH PPS REVENUE DEBIT

## 2025-03-19 PROCEDURE — 1090000002 HH PPS REVENUE DEBIT

## 2025-03-19 PROCEDURE — 1090000001 HH PPS REVENUE CREDIT

## 2025-03-20 ENCOUNTER — HOME CARE VISIT (OUTPATIENT)
Dept: HOME HEALTH SERVICES | Facility: HOME HEALTH | Age: 65
End: 2025-03-20
Payer: MEDICARE

## 2025-03-20 VITALS
RESPIRATION RATE: 16 BRPM | DIASTOLIC BLOOD PRESSURE: 60 MMHG | SYSTOLIC BLOOD PRESSURE: 112 MMHG | HEART RATE: 68 BPM | TEMPERATURE: 97.9 F

## 2025-03-20 PROCEDURE — 1090000002 HH PPS REVENUE DEBIT

## 2025-03-20 PROCEDURE — G0299 HHS/HOSPICE OF RN EA 15 MIN: HCPCS | Mod: HHH

## 2025-03-20 PROCEDURE — 1090000001 HH PPS REVENUE CREDIT

## 2025-03-20 PROCEDURE — G0180 MD CERTIFICATION HHA PATIENT: HCPCS | Performed by: INTERNAL MEDICINE

## 2025-03-20 ASSESSMENT — ENCOUNTER SYMPTOMS
PAIN SEVERITY GOAL: 0/10
PAIN: 1
HIGHEST PAIN SEVERITY IN PAST 24 HOURS: 3/10
APPETITE LEVEL: FAIR
PAIN LOCATION: PERINEUM
LOWEST PAIN SEVERITY IN PAST 24 HOURS: 1/10

## 2025-03-20 NOTE — HOME HEALTH
SN home viist  known to sn   stoma type: colostomy secondary to rectal prolapse. costedio 3/4/2025   size: 1 3/8   location: left   protrustion:yes   mucocutaneous junction: intact, visible sutures   mucosal condition and color: red and moist   Peristomal Skin: intact   Peristomal contour: flat   character of output: today liquid output but had not moved bowels in a couple days.   pouching system used : 2 piece red jollister convex closed pouch   accessories used: ring, remover .   buttocks wound is healing  reported a few drainage spots on undergarments.   but pain is improved.   patient did all hands on today did need a few reminders to have everything ready before removing old pouch.     supplies ordered via [employer] to [facility] likely [facility].     she will do care early next week and I will return the end of the week  may discharge and ensure supplies are present.

## 2025-03-20 NOTE — HOME HEALTH
"supplies ordered via [facility] to most likely [facility].     Image Not Available  Mouse over to zoom  ADHESIVE REMOVER WIPE  In Stock  Item # : [id number]   : [employer]   Item # : [id number]  Estimated Qty Available ([facility]) : 11,645 Each  1 Box = 50 [place]  Order Type : [facility]  Change Qty  Quantity : 50 Each  Click here to Delete Item  Image Not Available  Mouse over to zoom  NO STING BARRIER FILM CAVILON 3/4ML WIPES, ALCOHOL-FREE  In Stock  Sold by Box of 50  Item # : [id number]   : [employer]   Item # : [id number]  Estimated Qty Available ([facility]) : 431 Box  Order Type : [facility]  Change Qty  Quantity : 1 Box  Click here to Delete Item  Image Not Available  Mouse over to zoom  BARRIER RING STANDARD 4.5 MM, 2\" CERARING  In Stock  Item # : [id number]   : [employer]   Item # : [id number]  Estimated Qty Available ([facility]) : 7,565 Each  1 Box = 10 [place]  Order Type : [facility]  Change Qty  Quantity : 20 Each  Click here to Delete Item  Image Not Available  Mouse over to zoom  NEW IMAGE CERAPLUS CTF W TAPE 2-1/4\"FLANGE 57MM  In Stock  Sold by Box of 5  Item # : [id number]   : [employer]   Item # : [id number]  Estimated Qty Available ([facility]) : 295 Box  Order Type : [facility]  Change Qty  Quantity : 4 Box  Click here to Delete Item  Image Not Available  Mouse over to zoom  2 PC POUCH 2 1-4 IN NEW IMAGE CLOSED END  In Stock  Sold by Box of 60  Item # : [id number]   : [employer]   Item # : [id number]  Estimated Qty Available ([facility]) : 145 Box  Order Type : [facility]  Change Qty  Quantity : 1 Box  Click here to Delete Item  Image Not Available  Mouse over to zoom  DEODORIZER LUBRICATING 8 OZ COLOPLAST BRAVA  In Stock  Item # : [id number]   : [employer]   Item # : [id number]  Estimated Qty Available ([facility]) : 421 Each  1 Box = 16 [place]  Order Type : [facility]Connect"

## 2025-03-21 PROCEDURE — 1090000002 HH PPS REVENUE DEBIT

## 2025-03-21 PROCEDURE — 1090000001 HH PPS REVENUE CREDIT

## 2025-03-22 PROCEDURE — 1090000001 HH PPS REVENUE CREDIT

## 2025-03-22 PROCEDURE — 1090000002 HH PPS REVENUE DEBIT

## 2025-03-27 ENCOUNTER — HOME CARE VISIT (OUTPATIENT)
Dept: HOME HEALTH SERVICES | Facility: HOME HEALTH | Age: 65
End: 2025-03-27
Payer: MEDICARE

## 2025-03-27 VITALS
HEART RATE: 68 BPM | DIASTOLIC BLOOD PRESSURE: 60 MMHG | RESPIRATION RATE: 16 BRPM | SYSTOLIC BLOOD PRESSURE: 124 MMHG | TEMPERATURE: 98 F

## 2025-03-27 PROCEDURE — G0299 HHS/HOSPICE OF RN EA 15 MIN: HCPCS | Mod: HHH

## 2025-03-27 ASSESSMENT — ACTIVITIES OF DAILY LIVING (ADL)
OASIS_M1830: 00
HOME_HEALTH_OASIS: 00

## 2025-03-27 NOTE — CASE COMMUNICATION
Agency discharge completed.   she is doing well, independent in pouching. no more drainage with rectal/perineal incision.     set up with supplies with [employer].

## 2025-03-27 NOTE — HOME HEALTH
SN home viist   known to sn   stoma type: colostomy secondary to rectal prolapse. costedio 3/4/2025   size: 1 1/4  location: left   protrustion:yes   mucocutaneous junction: intact, visible sutures   mucosal condition and color: red and moist   Peristomal Skin: intact   Peristomal contour: flat   character of output: today liquid output but had not moved bowels in a couple days.   pouching system used : 2 piece red jollister convex closed pouch   accessories used:  remover .   no more drainage from buttocks wound  patient indpendent in care.   agency discharge completed.  she has recieved her order. wrote down all directions on how to reorder and provided precut numbers for 1 1/4 and 1 1/8 in .

## 2025-03-28 ENCOUNTER — PATIENT OUTREACH (OUTPATIENT)
Dept: CARE COORDINATION | Facility: CLINIC | Age: 65
End: 2025-03-28
Payer: MEDICARE

## 2025-03-28 NOTE — PROGRESS NOTES
Outreach call to patient to check in 2 weeks after hospital discharge to support smooth transition of care. Left voicemail message with CM name and contact number.   Will continue to follow.      Chinyere Johns RN/BETZY  [facility]  [phone]

## 2025-04-03 NOTE — PROGRESS NOTES
"Freda Douglas is a 64 year old female who is s/p sacrectomy for tumor and XRT with no anal tone and rectal prolapse and severe fecal incontinence for years with mixed urinary incontinence as well since her surgery.   Returns to office for a post op visit.    3/04/2025 Operation:  Laparoscopic Proctectomy; End Colostomy   Pathology:  A.  Portion of sigmoid colon and rectum, resection:  - Anorectum and portion of colon with prolapse changes.  - See note.  NOTE: Congo red stain is negative for amyloid deposits. Movat stains highlights multifocal hyalinization and intimal hyperplasia of submucosal and intramural arteries. These changes may be a result of long-standing prolapse.    No pain at all.  She is moving her bowels about once per day.  Stool consistency is soft and formed,   She is taking Miralax every other day.  She is changing the appliance twice per week.  She does have a fungal rash around the stoma.  Nurse Vinita gave Nystatin powder.  Appetite is good.  Weight is stable  Denies n/v.  Denies fever/chills.  No drainage from the perineal wound.  She is back to regular underpants.       Visit Vitals  /78   Pulse 80   Temp 36.1 °C (97 °F)   Ht 1.6 m (5' 3\")   Wt 72.6 kg (160 lb)   BMI 28.34 kg/m²   OB Status Postmenopausal   Smoking Status Former   BSA 1.8 m²         Review of Systems  Constitutional: Negative for fever, chills, anorexia, weight loss, malaise             ENMT: Negative for nasal discharge, congestion, ear pain, mouth pain, throat pain                 Respiratory: Negative for cough, hemoptysis, wheezing, shortness of breath                Cardiac: Negative for chest pain, dyspnea on exertion, orthopnea, palpitations, syncope         Gastrointestinal: Negative for nausea, vomiting, diarrhea, constipation, abdominal pain, (+)RECTAL PROLAPSE, (+)GERM CELL TUMOR OF SACRUM, S/P EXCISION, (+)COLOSTOMY     Genitourinary: Negative for discharge, dysuria, flank pain, frequency, hematuria, " (+)URINARY INCONTINENCE     Musculoskeletal: Negative for decreased ROM, pain, swelling, weakness          Neurological: Negative for dizziness, confusion, headache, seizures, syncope               Psychiatric: Negative for mood changes, anxiety, hallucinations, sleep changes, suicidal ideas        Skin: Negative for mass, pain, itching, rash, ulcer            Endocrine: Negative for heat intolerance, cold intolerance, excessive sweating, polyuria, excess thirst         Hematologic/Lymph: Negative for anemia, bruising, easy bleeding, night sweats, petechiae, history of DVT/PE or cancer               Allergic/Immunologic: Negative for anaphylaxis, itchy/ teary eyes, itching, sneezing, swelling     Physical Exam  Constitutional: Well developed, awake/alert/oriented x3, no distress, alert and cooperative             Eyes: Sclera anicteric, no conjunctival inflammation, conjugate gaze    ENMT: mucous membranes moist, no apparent injury,            Head/Neck: Neck supple, no apparent injury, No JVD, trachea midline, no bruits              Respiratory/Thorax: Patent airways, CTAB, normal breath sounds with good chest expansion, thorax symmetric         Cardiovascular: Regular, rate and rhythm, no murmurs, normal S1 and S2         Gastrointestinal: Nondistended, soft, non-tender, no rebound tenderness or guarding, no masses palpable, no organomegaly, +BS, no bruits               Extremities: normal extremities, no cyanosis edema, contusions or wounds, 2+ femoral pulses B/L              Neurological: alert and oriented x3, normal strength, Normal gait          Lymphatic: No palpable inguinal lymphadenopathy   Psychological: Appropriate mood and behavior         Skin: Warm and dry, no lesions, no rashes  Perineum: Completely healed     Impression:  Pt doing great s/p CP and end colostomy - she is doing amazing   Plan:    Colonoscopy through the colostomy  2027- can do high output pouch   Can eat and do what she wants   No  further follow up    Subjective   Patient ID: Freda Douglas is a 64 y.o. female who presents for Post-op.  HPI    Review of Systems    Objective   Physical Exam    Assessment/Plan            Rosie Frias MD 04/16/25 2:35 PM

## 2025-04-05 LAB
APPEARANCE UR: CLEAR
BACTERIA #/AREA URNS HPF: ABNORMAL /HPF
BACTERIA UR CULT: ABNORMAL
BILIRUB UR QL STRIP: NEGATIVE
COLOR UR: YELLOW
GLUCOSE UR QL STRIP: NEGATIVE
HGB UR QL STRIP: NEGATIVE
HYALINE CASTS #/AREA URNS LPF: ABNORMAL /LPF
KETONES UR QL STRIP: NEGATIVE
LEUKOCYTE ESTERASE UR QL STRIP: ABNORMAL
NITRITE UR QL STRIP: NEGATIVE
PH UR STRIP: 7.5 [PH] (ref 5–8)
PROT UR QL STRIP: NEGATIVE
RBC #/AREA URNS HPF: ABNORMAL /HPF
SERVICE CMNT-IMP: ABNORMAL
SP GR UR STRIP: 1.01 (ref 1–1.03)
SQUAMOUS #/AREA URNS HPF: ABNORMAL /HPF
WBC #/AREA URNS HPF: ABNORMAL /HPF

## 2025-04-08 ENCOUNTER — APPOINTMENT (OUTPATIENT)
Dept: PRIMARY CARE | Facility: CLINIC | Age: 65
End: 2025-04-08
Payer: MEDICARE

## 2025-04-08 VITALS
DIASTOLIC BLOOD PRESSURE: 69 MMHG | WEIGHT: 159 LBS | TEMPERATURE: 97.3 F | SYSTOLIC BLOOD PRESSURE: 102 MMHG | HEART RATE: 78 BPM | BODY MASS INDEX: 28.17 KG/M2 | OXYGEN SATURATION: 98 %

## 2025-04-08 DIAGNOSIS — R31.21 ASYMPTOMATIC MICROSCOPIC HEMATURIA: ICD-10-CM

## 2025-04-08 DIAGNOSIS — E78.2 MIXED HYPERLIPIDEMIA: ICD-10-CM

## 2025-04-08 DIAGNOSIS — Z00.00 ROUTINE GENERAL MEDICAL EXAMINATION AT HEALTH CARE FACILITY: ICD-10-CM

## 2025-04-08 DIAGNOSIS — M81.0 AGE-RELATED OSTEOPOROSIS WITHOUT CURRENT PATHOLOGICAL FRACTURE: Primary | ICD-10-CM

## 2025-04-08 DIAGNOSIS — R74.8 ABNORMAL LIVER ENZYMES: ICD-10-CM

## 2025-04-08 LAB
ALBUMIN SERPL BCP-MCNC: 3.7 G/DL (ref 3.4–5)
ALP SERPL-CCNC: 130 U/L (ref 45–117)
ALT SERPL W P-5'-P-CCNC: 19 U/L (ref 16–63)
APPEARANCE UR: CLEAR
AST SERPL W P-5'-P-CCNC: 15 U/L (ref 15–37)
BILIRUB DIRECT SERPL-MCNC: 0.1 MG/DL (ref 0–0.2)
BILIRUB SERPL-MCNC: 0.5 MG/DL (ref 0.2–1)
BILIRUB UR QL STRIP: NEGATIVE
CHOLEST SERPL-MCNC: 163 MG/DL (ref 0–199)
CHOLESTEROL/HDL RATIO: 3.2 (ref 4.2–7)
COLOR UR: YELLOW
GLUCOSE UR STRIP-MCNC: NEGATIVE MG/DL
HDLC SERPL-MCNC: 51 MG/DL (ref 40–59)
HGB UR QL STRIP: NEGATIVE
IS PATIENT FASTING: ABNORMAL
KETONES UR STRIP-MCNC: NEGATIVE MG/DL
LDLC SERPL DIRECT ASSAY-MCNC: 88 MG/DL (ref 0–100)
LEUKOCYTE ESTERASE UR QL STRIP: NEGATIVE
NITRITE UR QL STRIP: NEGATIVE
PH UR STRIP: 6 [PH]
PROT SERPL-MCNC: 7.3 G/DL (ref 6.4–8.2)
PROT UR STRIP-MCNC: NEGATIVE MG/DL
SP GR UR STRIP.AUTO: 1.02
TRIGL SERPL-MCNC: 111 MG/DL
UROBILINOGEN UR STRIP-ACNC: 0.2 E.U./DL

## 2025-04-08 PROCEDURE — 1123F ACP DISCUSS/DSCN MKR DOCD: CPT | Performed by: INTERNAL MEDICINE

## 2025-04-08 PROCEDURE — 1158F ADVNC CARE PLAN TLK DOCD: CPT | Performed by: INTERNAL MEDICINE

## 2025-04-08 PROCEDURE — 1159F MED LIST DOCD IN RCRD: CPT | Performed by: INTERNAL MEDICINE

## 2025-04-08 PROCEDURE — 1170F FXNL STATUS ASSESSED: CPT | Performed by: INTERNAL MEDICINE

## 2025-04-08 PROCEDURE — 80076 HEPATIC FUNCTION PANEL: CPT | Performed by: INTERNAL MEDICINE

## 2025-04-08 PROCEDURE — 3078F DIAST BP <80 MM HG: CPT | Performed by: INTERNAL MEDICINE

## 2025-04-08 PROCEDURE — 81003 URINALYSIS AUTO W/O SCOPE: CPT | Performed by: INTERNAL MEDICINE

## 2025-04-08 PROCEDURE — 1111F DSCHRG MED/CURRENT MED MERGE: CPT | Performed by: INTERNAL MEDICINE

## 2025-04-08 PROCEDURE — 80061 LIPID PANEL: CPT | Performed by: INTERNAL MEDICINE

## 2025-04-08 PROCEDURE — G0439 PPPS, SUBSEQ VISIT: HCPCS | Performed by: INTERNAL MEDICINE

## 2025-04-08 PROCEDURE — 1160F RVW MEDS BY RX/DR IN RCRD: CPT | Performed by: INTERNAL MEDICINE

## 2025-04-08 PROCEDURE — 1036F TOBACCO NON-USER: CPT | Performed by: INTERNAL MEDICINE

## 2025-04-08 PROCEDURE — 99214 OFFICE O/P EST MOD 30 MIN: CPT | Performed by: INTERNAL MEDICINE

## 2025-04-08 PROCEDURE — 3074F SYST BP LT 130 MM HG: CPT | Performed by: INTERNAL MEDICINE

## 2025-04-08 ASSESSMENT — ACTIVITIES OF DAILY LIVING (ADL)
TAKING MEDICATION: INDEPENDENT
HEARING - LEFT EAR: FUNCTIONAL
FEEDING YOURSELF: INDEPENDENT
MANAGING FINANCES: INDEPENDENT
DRESSING YOURSELF: INDEPENDENT
BATHING: INDEPENDENT
USING TELEPHONE: INDEPENDENT
TOILETING: INDEPENDENT
PATIENT'S MEMORY ADEQUATE TO SAFELY COMPLETE DAILY ACTIVITIES?: YES
EATING: INDEPENDENT
GROCERY SHOPPING: INDEPENDENT
USING TRANSPORTATION: INDEPENDENT
NEEDS ASSISTANCE WITH FOOD: INDEPENDENT
PREPARING MEALS: INDEPENDENT
ADEQUATE_TO_COMPLETE_ADL: YES
DOING HOUSEWORK: INDEPENDENT
ASSISTIVE_DEVICE: EYEGLASSES
WALKS IN HOME: INDEPENDENT
JUDGMENT_ADEQUATE_SAFELY_COMPLETE_DAILY_ACTIVITIES: YES
PILL BOX USED: NO
GROOMING: INDEPENDENT
STIL DRIVING: YES
HEARING - RIGHT EAR: FUNCTIONAL

## 2025-04-08 ASSESSMENT — PATIENT HEALTH QUESTIONNAIRE - PHQ9
1. LITTLE INTEREST OR PLEASURE IN DOING THINGS: NOT AT ALL
SUM OF ALL RESPONSES TO PHQ9 QUESTIONS 1 AND 2: 0
2. FEELING DOWN, DEPRESSED OR HOPELESS: NOT AT ALL
2. FEELING DOWN, DEPRESSED OR HOPELESS: NOT AT ALL
SUM OF ALL RESPONSES TO PHQ9 QUESTIONS 1 AND 2: 0
1. LITTLE INTEREST OR PLEASURE IN DOING THINGS: NOT AT ALL

## 2025-04-08 ASSESSMENT — GERIATRIC MINI NUTRITIONAL ASSESSMENT (MNA)
B WEIGHT LOSS DURING THE LAST 3 MONTHS: NO WEIGHT LOSS
C GENERAL MOBILITY: GOES OUT
A HAS FOOD INTAKE DECLINED OVER THE PAST 3 MONTHS DUE TO LOSS OF APPETITE, DIGESTIVE PROBLEMS, CHEWING OR SWALLOWING DIFFICULTIES?: NO DECREASE IN FOOD INTAKE
E NEUROPSYCHOLOGICAL PROBLEMS: NO PSYCHOLOGICAL PROBLEMS
D HAS SUFFERED PSYCHOLOGICAL STRESS OR ACUTE DISEASE IN THE PAST 3 MONTHS?: NO

## 2025-04-08 ASSESSMENT — COGNITIVE AND FUNCTIONAL STATUS - GENERAL
VERBAL FLUENCY - ANIMAL NAMES (0 TO 25): 25
TRAIL MAKING TEST: PATIENT COMPLETES TRAIL MAKING TEST PROPERLY.

## 2025-04-08 ASSESSMENT — ENCOUNTER SYMPTOMS
OCCASIONAL FEELINGS OF UNSTEADINESS: 0
DEPRESSION: 0
LOSS OF SENSATION IN FEET: 0

## 2025-04-08 NOTE — PROGRESS NOTES
Subjective   Reason for Visit: Freda Douglas is an 64 y.o. female here for a Medicare Wellness visit.          Reviewed all medications by prescribing practitioner or clinical pharmacist (such as prescriptions, OTCs, herbal therapies and supplements) and documented in the medical record.    HPI  64 years old female comes in to see me for her subsequent Medicare wellness exam and follow-up visit.  She has been going through major surgery by Dr. Rosie Frias.  She underwent laparoscopic proctectomy and colostomy for colorectal prolapse.  She is recovering and she is doing well from her procedure.  Her medication reviewed and reconciled.  She takes atorvastatin 20 mg a day, clonidine 01 mg as needed for hypertension, Prilosec 20 mg as needed, oxybutynin 5 mg twice a day, triamterene hydrochlorothiazide 37.5/25 mg once a day and verapamil or Calan  mg a day.  No refill needed.  Lives in [place] with her dog.  2 children a son and a daughter.  No known allergies.  Non-smoker and no alcohol intake.  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  Family positive for hypertension.  Patient Care Team:  Ishan Chandra MD as PCP - General (Internal Medicine)  Ishan Chandra MD as PCP - [employer] PCP  Chinyere Johns RN as Care Manager (Case Management)     Review of Systems  12 system review 12 system are negative.  Objective   Vitals:  /69 (BP Location: Left arm, Patient Position: Sitting, BP Cuff Size: Adult)   Pulse 78   Temp 36.3 °C (97.3 °F) (Temporal)   Wt 72.1 kg (159 lb)   SpO2 98%   BMI 28.17 kg/m²       Physical Exam  Alert oriented in no acute distress pleasant and cooperative.  Neck supple no masses lymph no thyromegaly or jugular venous distention no carotid bruits.  Lungs clear no rales wheezing or crackles.  Heart normal S1 and S2 regular rhythm.  Abdomen benign nontender no masses no organomegaly.  Bowel sounds present.  Colostomy bag present clean and well secured.   Neurological exam intact equal strength in the upper and in the lower extremities.  No sensory deficit.  Normal cranial nerves.  Skin intact  Assessment & Plan  Age-related osteoporosis without current pathological fracture    Orders:    XR DEXA bone density; Future    Abnormal liver enzymes    Orders:    Hepatic Function Panel    Mixed hyperlipidemia    Orders:    Lipid Panel    Asymptomatic microscopic hematuria    Orders:    POCT UA (Automated) docked device    Routine general medical examination at health care facility    Orders:    1 Year Follow Up In Primary Care - Wellness Exam    1 Year Follow Up In Primary Care - Wellness Exam; Future

## 2025-04-09 ENCOUNTER — APPOINTMENT (OUTPATIENT)
Dept: UROLOGY | Facility: CLINIC | Age: 65
End: 2025-04-09
Payer: MEDICARE

## 2025-04-09 VITALS — TEMPERATURE: 96.6 F

## 2025-04-09 DIAGNOSIS — K21.9 GASTROESOPHAGEAL REFLUX DISEASE WITHOUT ESOPHAGITIS: ICD-10-CM

## 2025-04-09 DIAGNOSIS — I10 HYPERTENSION, UNSPECIFIED TYPE: ICD-10-CM

## 2025-04-09 DIAGNOSIS — N32.81 OAB (OVERACTIVE BLADDER): Primary | ICD-10-CM

## 2025-04-09 PROCEDURE — 64566 NEUROELTRD STIM POST TIBIAL: CPT | Performed by: PHYSICIAN ASSISTANT

## 2025-04-09 ASSESSMENT — PAIN SCALES - GENERAL: PAINLEVEL_OUTOF10: 0-NO PAIN

## 2025-04-09 ASSESSMENT — ENCOUNTER SYMPTOMS: FREQUENCY: 1

## 2025-04-09 NOTE — PROGRESS NOTES
Subjective   Patient ID: Freda Douglas is a 64 y.o. female who presents for Incontinence of feces, unspecified fecal incontinence type (ptns).  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 PTNS was stopped for this period of time until she healed.   She reports the last few days she has been experiencing icreased urgency and nocturia.             Patient ID: Freda Douglas is a 64 y.o. female.    Percutaneous Tibial Nerve Stimulation    Date/Time: 4/9/2025 12:35 PM    Performed by: Josefina Gómez PA-C  Authorized by: Josefina Gómez PA-C    Procedure Details     Indications: urge incontinence, urinary frequency and urinary urgency      PTNS session #: 11    Bladder symptoms: improved      Ankle used: left      Stimulator intensity (mA): 3            Review of Systems   Genitourinary:  Positive for frequency and urgency.   All other systems reviewed and are negative.      Objective   Physical Exam  Constitutional:       General: She is not in acute distress.     Appearance: Normal appearance.   HENT:      Head: Normocephalic and atraumatic.      Nose: Nose normal.      Mouth/Throat:      Mouth: Mucous membranes are dry.   Cardiovascular:      Rate and Rhythm: Normal rate.   Pulmonary:      Effort: Pulmonary effort is normal.   Abdominal:      General: Abdomen is flat.      Palpations: Abdomen is soft.   Musculoskeletal:         General: Normal range of motion.      Cervical back: Normal range of motion and neck supple.   Skin:     General: Skin is warm and dry.   Neurological:      General: No focal deficit present.      Mental Status: She is alert  and oriented to person, place, and time.   Psychiatric:         Mood and Affect: Mood normal.         Assessment/Plan     OAB  Patient tolerated procedure well.   She is noticing improved urinary symptoms with PTNS  Will finish all 12 treatments and will start maintenance therapy.   Continue Oxybutynin.        Josefina Gómez PA-C 04/09/25 12:35 PM

## 2025-04-10 ENCOUNTER — PATIENT OUTREACH (OUTPATIENT)
Dept: CARE COORDINATION | Facility: CLINIC | Age: 65
End: 2025-04-10
Payer: MEDICARE

## 2025-04-10 ENCOUNTER — TELEPHONE (OUTPATIENT)
Dept: PRIMARY CARE | Facility: CLINIC | Age: 65
End: 2025-04-10
Payer: MEDICARE

## 2025-04-10 DIAGNOSIS — I10 HYPERTENSION, UNSPECIFIED TYPE: ICD-10-CM

## 2025-04-10 DIAGNOSIS — K21.9 GASTROESOPHAGEAL REFLUX DISEASE WITHOUT ESOPHAGITIS: ICD-10-CM

## 2025-04-10 RX ORDER — ATORVASTATIN CALCIUM 20 MG/1
20 TABLET, FILM COATED ORAL DAILY
Qty: 90 TABLET | Refills: 3 | Status: SHIPPED | OUTPATIENT
Start: 2025-04-10

## 2025-04-10 RX ORDER — OMEPRAZOLE 20 MG/1
CAPSULE, DELAYED RELEASE ORAL
Qty: 90 CAPSULE | Refills: 1 | Status: SHIPPED | OUTPATIENT
Start: 2025-04-10

## 2025-04-10 RX ORDER — OMEPRAZOLE 20 MG/1
20 CAPSULE, DELAYED RELEASE ORAL
Qty: 90 CAPSULE | Refills: 1 | Status: SHIPPED | OUTPATIENT
Start: 2025-04-10

## 2025-04-10 RX ORDER — ATORVASTATIN CALCIUM 20 MG/1
20 TABLET, FILM COATED ORAL DAILY
Qty: 90 TABLET | Refills: 3 | Status: SHIPPED | OUTPATIENT
Start: 2025-04-10 | End: 2026-04-10

## 2025-04-10 NOTE — PROGRESS NOTES
Outreach call to patient to check in 30 days after hospital discharge to support smooth transition of care.  Left voicemail message with CM name and contact number. CM with three unsuccessful outreach calls. CM will close case as unable to reach.    Chinyere Johns RN/BETZY   [facility]  [phone]

## 2025-04-10 NOTE — TELEPHONE ENCOUNTER
----- Message from Ishan Chandra sent at 4/10/2025  8:48 AM EDT -----  Regarding: R  Lab results from the last visit they look normal that include your lipid panel cholesterol triglyceride etc. and your liver function test is normal, your urine is clear and clean showing no abnormalities.  If you have any questions please call the office also let you know your medications were refilled

## 2025-04-16 ENCOUNTER — OFFICE VISIT (OUTPATIENT)
Dept: SURGERY | Facility: CLINIC | Age: 65
End: 2025-04-16
Payer: MEDICARE

## 2025-04-16 ENCOUNTER — APPOINTMENT (OUTPATIENT)
Dept: UROLOGY | Facility: CLINIC | Age: 65
End: 2025-04-16
Payer: MEDICARE

## 2025-04-16 VITALS — HEIGHT: 63 IN | BODY MASS INDEX: 28.17 KG/M2 | TEMPERATURE: 96.7 F | WEIGHT: 159 LBS

## 2025-04-16 VITALS
DIASTOLIC BLOOD PRESSURE: 78 MMHG | BODY MASS INDEX: 28.35 KG/M2 | WEIGHT: 160 LBS | TEMPERATURE: 97 F | HEIGHT: 63 IN | HEART RATE: 80 BPM | SYSTOLIC BLOOD PRESSURE: 169 MMHG

## 2025-04-16 DIAGNOSIS — R15.9 INCONTINENCE OF FECES WITH FECAL URGENCY: ICD-10-CM

## 2025-04-16 DIAGNOSIS — N32.81 OAB (OVERACTIVE BLADDER): Primary | ICD-10-CM

## 2025-04-16 DIAGNOSIS — K62.3 RECTAL PROLAPSE: Primary | ICD-10-CM

## 2025-04-16 DIAGNOSIS — Z09 POSTOPERATIVE EXAMINATION: ICD-10-CM

## 2025-04-16 DIAGNOSIS — R15.2 INCONTINENCE OF FECES WITH FECAL URGENCY: ICD-10-CM

## 2025-04-16 PROCEDURE — 99211 OFF/OP EST MAY X REQ PHY/QHP: CPT | Performed by: COLON & RECTAL SURGERY

## 2025-04-16 PROCEDURE — 1126F AMNT PAIN NOTED NONE PRSNT: CPT | Performed by: COLON & RECTAL SURGERY

## 2025-04-16 PROCEDURE — 64566 NEUROELTRD STIM POST TIBIAL: CPT | Performed by: PHYSICIAN ASSISTANT

## 2025-04-16 PROCEDURE — 1159F MED LIST DOCD IN RCRD: CPT | Performed by: COLON & RECTAL SURGERY

## 2025-04-16 PROCEDURE — 1036F TOBACCO NON-USER: CPT | Performed by: COLON & RECTAL SURGERY

## 2025-04-16 PROCEDURE — 3078F DIAST BP <80 MM HG: CPT | Performed by: COLON & RECTAL SURGERY

## 2025-04-16 PROCEDURE — 3008F BODY MASS INDEX DOCD: CPT | Performed by: COLON & RECTAL SURGERY

## 2025-04-16 PROCEDURE — 3077F SYST BP >= 140 MM HG: CPT | Performed by: COLON & RECTAL SURGERY

## 2025-04-16 RX ORDER — SILVER NITRATE 38.21; 12.74 MG/1; MG/1
STICK TOPICAL ONCE
Status: SHIPPED | OUTPATIENT
Start: 2025-04-16

## 2025-04-16 RX ORDER — NYSTATIN 100000 [USP'U]/G
1 POWDER TOPICAL ONCE
Status: SHIPPED | OUTPATIENT
Start: 2025-04-16

## 2025-04-16 ASSESSMENT — PAIN SCALES - GENERAL
PAINLEVEL_OUTOF10: 0-NO PAIN
PAINLEVEL_OUTOF10: 0-NO PAIN

## 2025-04-16 ASSESSMENT — ENCOUNTER SYMPTOMS: FREQUENCY: 1

## 2025-04-16 NOTE — PROGRESS NOTES
Subjective   Patient ID: Freda Douglas is a 64 y.o. female who presents for PTNS.  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 PTNS was stopped for this period of time until she healed.   She reports persistent nocturia. Urgency is improving.           Patient ID: Freda Douglas is a 64 y.o. female.    Percutaneous Tibial Nerve Stimulation    Date/Time: 4/16/2025 10:47 AM    Performed by: Josefina Gómez PA-C  Authorized by: Josefina Gómez PA-C    Procedure Details     Indications: urge incontinence, urinary frequency and urinary urgency      PTNS session #: 12    Bladder symptoms: improved      Ankle used: left      Stimulator intensity (mA): 3          Review of Systems   Genitourinary:  Positive for frequency and urgency.   All other systems reviewed and are negative.      Objective   Physical Exam  Constitutional:       General: She is not in acute distress.     Appearance: Normal appearance.   HENT:      Head: Normocephalic and atraumatic.      Nose: Nose normal.      Mouth/Throat:      Mouth: Mucous membranes are dry.   Cardiovascular:      Rate and Rhythm: Normal rate.   Pulmonary:      Effort: Pulmonary effort is normal.   Abdominal:      General: Abdomen is flat.      Palpations: Abdomen is soft.   Musculoskeletal:         General: Normal range of motion.      Cervical back: Normal range of motion and neck supple.   Skin:     General: Skin is warm and dry.   Neurological:      General: No focal deficit present.      Mental Status: She is alert and oriented to person, place, and time.   Psychiatric:         Mood and Affect: Mood normal.        Assessment/Plan     OAB  Patient tolerated procedure well.   Will start maintenance PTNS in 3 weeks since she has seen improvement of her symptoms.        Josefina Gómez PA-C 04/16/25 10:47 AM

## 2025-04-16 NOTE — NURSING NOTE
"Ostomy/Wound Care Consult     Visit Date: 4/16/2025      Patient Name: Freda Douglas         MRN: [id number]           YOB: 1960     [facility] nursing visit outcome: Pt here for post-op check up with Dr. Frias.  She is doing great with stoma care and pouching. Had a granuloma at 4-5 o'clock that silver nitrate was applied to as well as a little bit of a fungal rash and Nystatin powder prescribed.  She is going to [place] with her family next week, so discussed travel tips and belts, wraps and clothing options to help with discretion.     [facility] next scheduled visit/plan: TBD    Stoma Type: End Colostomy  Diameter: 1 1/4\"  Location: LUQ  Protrusion: Budded  Mucosal Condition and Color: Moist, Red  Mucocutaneous Junction: Intact  Peristomal Skin: Fungal Rash  Location of Skin Impairment: very few lesions scattered, pt complains of itching  Peristomal Contour: Flat and Shallow Concave  Supportive Tissue: Semi-Soft  Character of Output: Brown, Pasty Stool, and Formed Stool, occasionally liquid  Emptying Frequency: usually 2/day.  Pt had bowel irregularity post-operatively and did not have bowel movement for many days  Removed/Current Pouching System: 2 1/4\" [employer] soft convex wafer with closed end pouch  Current Wearing Time: 3-4 Days    Recommendations:   Skin Care: dusted with Nystatin powder  Pouching System: same, used stoma paste over granuloma that was silver nitrated today to prevent moisture to this area  Wear Time: same Days  Other: provided handout on different belt/wrap options.  Contacted [employer] to request pre-cut 1 1/4\" soft convex wafer ship with next order.    Photo: 4/16/2025       Incision: healed    Supplier: [employer]    Time Increment: 45 minutes    Vinita OCAMPON, RN, CWOCN  4/16/2025  3:27 PM        "

## 2025-04-23 ENCOUNTER — APPOINTMENT (OUTPATIENT)
Dept: SURGERY | Facility: CLINIC | Age: 65
End: 2025-04-23
Payer: MEDICARE

## 2025-04-23 ENCOUNTER — APPOINTMENT (OUTPATIENT)
Dept: UROLOGY | Facility: CLINIC | Age: 65
End: 2025-04-23
Payer: MEDICARE

## 2025-04-30 ENCOUNTER — APPOINTMENT (OUTPATIENT)
Dept: UROLOGY | Facility: CLINIC | Age: 65
End: 2025-04-30
Payer: MEDICARE

## 2025-05-07 ENCOUNTER — APPOINTMENT (OUTPATIENT)
Dept: UROLOGY | Facility: CLINIC | Age: 65
End: 2025-05-07
Payer: MEDICARE

## 2025-05-07 VITALS — TEMPERATURE: 97.1 F

## 2025-05-07 DIAGNOSIS — N32.81 OAB (OVERACTIVE BLADDER): Primary | ICD-10-CM

## 2025-05-07 PROCEDURE — 64566 NEUROELTRD STIM POST TIBIAL: CPT | Performed by: PHYSICIAN ASSISTANT

## 2025-05-07 ASSESSMENT — ENCOUNTER SYMPTOMS
FREQUENCY: 1
DEPRESSION: 0

## 2025-05-07 ASSESSMENT — PAIN SCALES - GENERAL: PAINLEVEL_OUTOF10: 0-NO PAIN

## 2025-05-07 NOTE — PROGRESS NOTES
Subjective   Patient ID: Freda Douglas is a 64 y.o. female who presents for Incontinence of feces, unspecified fecal incontinence type.  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     Last treatment was 3 weeks ago.     Patient ID: Freda Douglas is a 64 y.o. female.    Percutaneous Tibial Nerve Stimulation    Date/Time: 5/7/2025 12:52 PM    Performed by: Josefina Gómez PA-C  Authorized by: Josefina Gómez PA-C    Procedure Details     Indications: urge incontinence, urinary frequency and urinary urgency      PTNS session #: monthly maintenance    Bladder symptoms: improved      Ankle used: left      Stimulator intensity (mA): 8            Review of Systems   Genitourinary:  Positive for frequency and urgency.   All other systems reviewed and are negative.      Objective   Physical Exam  Constitutional:       General: She is not in acute distress.     Appearance: Normal appearance.   HENT:      Head: Normocephalic and atraumatic.      Nose: Nose normal.      Mouth/Throat:      Mouth: Mucous membranes are dry.   Cardiovascular:      Rate and Rhythm: Normal rate.   Pulmonary:      Effort: Pulmonary effort is normal.   Abdominal:      General: Abdomen is flat.      Palpations: Abdomen is soft.   Musculoskeletal:         General: Normal range of motion.      Cervical back: Normal range of motion and neck supple.   Skin:     General: Skin is warm and dry.   Neurological:      General: No focal deficit present.      Mental Status: She is alert and oriented to person, place, and time.   Psychiatric:         Mood and  Affect: Mood normal.         Assessment/Plan     OAB  Patient tolerated procedure well.   Will continue maintenance PTNS in 3 weeks since her symptoms returned around the third week.     Josefina Gómez PA-C 05/07/25 12:52 PM

## 2025-05-09 DIAGNOSIS — N39.46 MIXED STRESS AND URGE URINARY INCONTINENCE: ICD-10-CM

## 2025-05-09 RX ORDER — OXYBUTYNIN CHLORIDE 5 MG/1
5 TABLET ORAL 2 TIMES DAILY
Qty: 180 TABLET | Refills: 2 | Status: SHIPPED | OUTPATIENT
Start: 2025-05-09

## 2025-05-14 ENCOUNTER — APPOINTMENT (OUTPATIENT)
Dept: UROLOGY | Facility: CLINIC | Age: 65
End: 2025-05-14
Payer: MEDICARE

## 2025-06-03 ENCOUNTER — APPOINTMENT (OUTPATIENT)
Dept: UROLOGY | Facility: CLINIC | Age: 65
End: 2025-06-03
Payer: MEDICARE

## 2025-06-10 ENCOUNTER — APPOINTMENT (OUTPATIENT)
Dept: UROLOGY | Facility: CLINIC | Age: 65
End: 2025-06-10
Payer: MEDICARE

## 2025-07-08 DIAGNOSIS — Z12.31 ENCOUNTER FOR SCREENING MAMMOGRAM FOR BREAST CANCER: ICD-10-CM

## 2025-07-18 ENCOUNTER — TELEPHONE (OUTPATIENT)
Dept: PRIMARY CARE | Facility: CLINIC | Age: 65
End: 2025-07-18
Payer: MEDICARE

## 2025-07-18 DIAGNOSIS — I10 HYPERTENSION, UNSPECIFIED TYPE: ICD-10-CM

## 2025-07-19 DIAGNOSIS — I10 HYPERTENSION, UNSPECIFIED TYPE: ICD-10-CM

## 2025-07-19 RX ORDER — TRIAMTERENE AND HYDROCHLOROTHIAZIDE 37.5; 25 MG/1; MG/1
1 CAPSULE ORAL EVERY MORNING
Qty: 90 CAPSULE | Refills: 3 | Status: SHIPPED | OUTPATIENT
Start: 2025-07-19 | End: 2026-07-19

## 2025-08-22 DIAGNOSIS — K21.9 GASTROESOPHAGEAL REFLUX DISEASE WITHOUT ESOPHAGITIS: ICD-10-CM

## 2025-08-22 RX ORDER — OMEPRAZOLE 20 MG/1
CAPSULE, DELAYED RELEASE ORAL
Qty: 90 CAPSULE | Refills: 1 | Status: SHIPPED | OUTPATIENT
Start: 2025-08-22

## 2025-08-22 RX ORDER — OMEPRAZOLE 20 MG/1
20 CAPSULE, DELAYED RELEASE ORAL
Qty: 90 CAPSULE | Refills: 1 | Status: SHIPPED | OUTPATIENT
Start: 2025-08-22

## 2026-04-07 ENCOUNTER — APPOINTMENT (OUTPATIENT)
Dept: PRIMARY CARE | Facility: CLINIC | Age: 66
End: 2026-04-07
Payer: MEDICARE

## (undated) DEVICE — TUBING, SMOKE EVAC, 3/8 X 10 FT

## (undated) DEVICE — SUTURE, VICRYL, 2-0, 27 IN, SH, UNDYED

## (undated) DEVICE — TUBING, SUCTION, NON-CONDUCTIVE, W/CONNECT,.25 IN X 12 FT, STERILE, LF

## (undated) DEVICE — SYRINGE, 12 CC, LUER LOCK, CONTROL, MONOJECT

## (undated) DEVICE — COVER, BACK TABLE, 65 X 90, HVY REINFORCED

## (undated) DEVICE — SEAL, COHESIVE, EAKIN, SMALL, 2 IN

## (undated) DEVICE — TAPE, UMBILICAL, ROUND, 2 X 30 IN, WHITE

## (undated) DEVICE — POSITIONING KIT, PAGAZZI, PINK PAD XL, W/ ARM AND HEAD REST

## (undated) DEVICE — SUTURE, MONOCRYL, 4-0, 18 IN, PS2, UNDYED

## (undated) DEVICE — SUTURE, VICRYL 0, 36 IN, CT-1, VIOLET

## (undated) DEVICE — LIGASURE, V SEALER/DIVIDER  5MM BLUNT TIP

## (undated) DEVICE — DRAPE, UNDERBUTTOCKS, W / 27IN FLUID POUCH

## (undated) DEVICE — SUTURE, VICRYL PLUS 3-0, SH, 27IN

## (undated) DEVICE — COUNTER, NEEDLE, FOAM BLOCK, POP-N-COUNT, W/BLADEGUARD, W/ADHESIVE 40 COUNT, RED

## (undated) DEVICE — APPLIER, CLIP LARGE XI

## (undated) DEVICE — DRAPE, SHEET, UTILITY, NON ABSORBENT, 18 X 26 IN, LF

## (undated) DEVICE — SPONGE, LAP, XRAY DECT, 18IN X 18IN, W/MASTER DMT, STERILE

## (undated) DEVICE — ELECTRODE, ELECTROSURGICAL, BLADE, NONSTICK, MODIFIED, 6.5 IN X 165 MM

## (undated) DEVICE — OSTOMY KIT, POSTOPERATIVE, COLOSTOMY/ILEOSTOMY, LOOP, 1.75 IN FLANGE, STERILE

## (undated) DEVICE — CLEAN KIT, ANTIFOG SCOPE, SEE SHARP 150MM

## (undated) DEVICE — SUTURE, PDS II, 2-0, 27 IN, CT-1 VIL MONO, LF

## (undated) DEVICE — DEVICE, GELPOINT, SINGLE PORT

## (undated) DEVICE — SUTURE, PROLENE, 0, 30 IN, SH

## (undated) DEVICE — SUTURE, PDS II, 0 36 IN, CT-1, VIOLET

## (undated) DEVICE — GOWN, SURGICAL, SMARTGOWN, XLARGE, STERILE

## (undated) DEVICE — Device

## (undated) DEVICE — STAPLER, LINEAR, 3.5 60MM, RELOADABLE, BLUE

## (undated) DEVICE — TIP,  ELECTRODE COATED INSULATED, EXTENDED, LF

## (undated) DEVICE — TOWEL, SURGICAL, NEURO, O/R, 16 X 26, BLUE, STERILE

## (undated) DEVICE — NERVE BLOCK, STIMUQUIK, SINGLE-SHOT, 21GA X 3-1/2

## (undated) DEVICE — LIGASURE IMPACT, 18CM

## (undated) DEVICE — GLOVE, SURGICAL, PROTEXIS PI BLUE W/NEUTHERA, 6.5, PF, LF

## (undated) DEVICE — DRAPE, LEGGINGS, 28.5 X 43 IN, DISPOSABLE, LF, STERILE